# Patient Record
Sex: FEMALE | Race: WHITE | NOT HISPANIC OR LATINO | Employment: FULL TIME | URBAN - NONMETROPOLITAN AREA
[De-identification: names, ages, dates, MRNs, and addresses within clinical notes are randomized per-mention and may not be internally consistent; named-entity substitution may affect disease eponyms.]

---

## 2017-10-20 ENCOUNTER — OFFICE VISIT (OUTPATIENT)
Dept: INTERNAL MEDICINE | Facility: CLINIC | Age: 22
End: 2017-10-20

## 2017-10-20 VITALS
BODY MASS INDEX: 24.91 KG/M2 | WEIGHT: 155 LBS | RESPIRATION RATE: 12 BRPM | HEART RATE: 86 BPM | OXYGEN SATURATION: 98 % | HEIGHT: 66 IN | TEMPERATURE: 97.4 F | DIASTOLIC BLOOD PRESSURE: 70 MMHG | SYSTOLIC BLOOD PRESSURE: 112 MMHG

## 2017-10-20 DIAGNOSIS — E53.8 LOW VITAMIN B12 LEVEL: ICD-10-CM

## 2017-10-20 DIAGNOSIS — K62.5 BRBPR (BRIGHT RED BLOOD PER RECTUM): Primary | ICD-10-CM

## 2017-10-20 PROCEDURE — 99214 OFFICE O/P EST MOD 30 MIN: CPT | Performed by: FAMILY MEDICINE

## 2017-10-20 NOTE — PROGRESS NOTES
Subjective    Wanda Hunt is a 22 y.o. female here for:  Chief Complaint   Patient presents with   • Rectal Bleeding     blood in stool for about 3 weeks     History of Present Illness   She's had blood in stool x 3 weeks. No straining during bowel movements and bowel movements are not painful. She was seen previously by GI and they recommended scopes but she did not have them done. There's a family history of vitamin B12 deficiency and her level has been low previously. She stays tired. Patient has had blood in stool and bright red blood per rectum for years. She saw me in the late summer of 2015 for the same issue. No known family history of IBD.    The following portions of the patient's history were reviewed and updated as appropriate: allergies, current medications, past family history, past medical history, past social history, past surgical history and problem list.    Review of Systems   Constitutional: Negative for activity change and appetite change.   Gastrointestinal: Positive for blood in stool. Negative for abdominal pain, constipation and nausea.       Vitals:    10/20/17 1639   BP: 112/70   Pulse: 86   Resp: 12   Temp: 97.4 °F (36.3 °C)   SpO2: 98%       Objective   Physical Exam   Constitutional: She is oriented to person, place, and time. Vital signs are normal. She appears well-developed and well-nourished. She is active. She does not have a sickly appearance. She does not appear ill.   Appears stated age. Well groomed. normal body weight.   HENT:   Head: Normocephalic and atraumatic.   Right Ear: Hearing normal.   Left Ear: Hearing normal.   Eyes: EOM are normal. Pupils are equal, round, and reactive to light. No scleral icterus.   Neck: Neck supple.   Pulmonary/Chest: Effort normal.   Abdominal: Soft. Bowel sounds are normal. She exhibits no mass. There is tenderness in the epigastric area. There is no rigidity, no rebound, no guarding and negative Chanel's sign.   Musculoskeletal: She  exhibits no edema or deformity.   Neurological: She is alert and oriented to person, place, and time. Gait normal.   Skin: Skin is warm. She is not diaphoretic. No cyanosis. No pallor.   Psychiatric: She has a normal mood and affect. Her speech is normal and behavior is normal. Judgment normal.   Nursing note and vitals reviewed.    Reviewed note from GI consult dated 9/16/15. Concerns for IBD. Had labs that showed vitamin B12 in upper 200s.     Lab Results   Component Value Date    [id number] 447 06/20/2016         Assessment/Plan   Wanda was seen today for rectal bleeding.    Diagnoses and all orders for this visit:    BRBPR (bright red blood per rectum)  -     Inflammatory Bowel Disease Panel  -     Celiac Disease Panel  -     CBC Auto Differential  -     Vitamin B12 & Folate  -     Comprehensive Metabolic Panel  -     Ambulatory Referral to Gastroenterology    Low vitamin B12 level  -     Inflammatory Bowel Disease Panel  -     Celiac Disease Panel  -     CBC Auto Differential  -     Vitamin B12 & Folate  -     Comprehensive Metabolic Panel  -     Homocysteine  -     Methylmalonic Acid, Serum               Jennifer Osborne MD

## 2017-10-24 LAB
ALBUMIN SERPL-MCNC: 4.8 G/DL (ref 3.5–5)
ALBUMIN/GLOB SERPL: 1.7 G/DL (ref 1–2)
ALP SERPL-CCNC: 56 U/L (ref 38–126)
ALT SERPL-CCNC: 27 U/L (ref 13–69)
AST SERPL-CCNC: 20 U/L (ref 15–46)
BAKER'S YEAST IGA QN: <20 UNITS (ref 0–24.9)
BAKER'S YEAST IGG QN: <20 UNITS (ref 0–24.9)
BASOPHILS # BLD AUTO: 0.05 10*3/MM3 (ref 0–0.2)
BASOPHILS NFR BLD AUTO: 0.7 % (ref 0–2.5)
BILIRUB SERPL-MCNC: 0.6 MG/DL (ref 0.2–1.3)
BUN SERPL-MCNC: 20 MG/DL (ref 7–20)
BUN/CREAT SERPL: 25 (ref 7.1–23.5)
CALCIUM SERPL-MCNC: 10 MG/DL (ref 8.4–10.2)
CHLORIDE SERPL-SCNC: 100 MMOL/L (ref 98–107)
CO2 SERPL-SCNC: 28 MMOL/L (ref 26–30)
CREAT SERPL-MCNC: 0.8 MG/DL (ref 0.6–1.3)
ENDOMYSIUM IGA SER QL: NEGATIVE
EOSINOPHIL # BLD AUTO: 0.09 10*3/MM3 (ref 0–0.7)
EOSINOPHIL NFR BLD AUTO: 1.3 % (ref 0–7)
ERYTHROCYTE [DISTWIDTH] IN BLOOD BY AUTOMATED COUNT: 11.3 % (ref 11.5–14.5)
FOLATE SERPL-MCNC: 14 NG/ML
GFR SERPLBLD CREATININE-BSD FMLA CKD-EPI: 110 ML/MIN/1.73
GFR SERPLBLD CREATININE-BSD FMLA CKD-EPI: 91 ML/MIN/1.73
GLOBULIN SER CALC-MCNC: 2.8 GM/DL
GLUCOSE SERPL-MCNC: 152 MG/DL (ref 74–98)
HCT VFR BLD AUTO: 41.8 % (ref 37–47)
HCYS SERPL-SCNC: 8.7 UMOL/L (ref 0–15)
HGB BLD-MCNC: 13.9 G/DL (ref 12–16)
IGA SERPL-MCNC: 190 MG/DL (ref 87–352)
IMM GRANULOCYTES # BLD: 0.02 10*3/MM3 (ref 0–0.06)
IMM GRANULOCYTES NFR BLD: 0.3 % (ref 0–0.6)
LYMPHOCYTES # BLD AUTO: 2.19 10*3/MM3 (ref 0.6–3.4)
LYMPHOCYTES NFR BLD AUTO: 31.3 % (ref 10–50)
MCH RBC QN AUTO: 29.8 PG (ref 27–31)
MCHC RBC AUTO-ENTMCNC: 33.3 G/DL (ref 30–37)
MCV RBC AUTO: 89.5 FL (ref 81–99)
METHYLMALONATE SERPL-SCNC: 182 NMOL/L (ref 0–378)
MONOCYTES # BLD AUTO: 0.36 10*3/MM3 (ref 0–0.9)
MONOCYTES NFR BLD AUTO: 5.1 % (ref 0–12)
NEUTROPHILS # BLD AUTO: 4.29 10*3/MM3 (ref 2–6.9)
NEUTROPHILS NFR BLD AUTO: 61.3 % (ref 37–80)
NRBC BLD AUTO-RTO: 0 /100 WBC (ref 0–0)
P-ANCA ATYPICAL TITR SER IF: NORMAL TITER
PLATELET # BLD AUTO: 229 10*3/MM3 (ref 130–400)
POTASSIUM SERPL-SCNC: 3.7 MMOL/L (ref 3.5–5.1)
PROT SERPL-MCNC: 7.6 G/DL (ref 6.3–8.2)
RBC # BLD AUTO: 4.67 10*6/MM3 (ref 4.2–5.4)
SODIUM SERPL-SCNC: 141 MMOL/L (ref 137–145)
TTG IGA SER-ACNC: <2 U/ML (ref 0–3)
VIT B12 SERPL-MCNC: 426 PG/ML (ref 239–931)
WBC # BLD AUTO: 7 10*3/MM3 (ref 4.8–10.8)

## 2017-11-03 ENCOUNTER — OFFICE VISIT (OUTPATIENT)
Dept: GASTROENTEROLOGY | Facility: CLINIC | Age: 22
End: 2017-11-03

## 2017-11-03 VITALS
DIASTOLIC BLOOD PRESSURE: 78 MMHG | TEMPERATURE: 97.3 F | HEIGHT: 66 IN | SYSTOLIC BLOOD PRESSURE: 110 MMHG | HEART RATE: 88 BPM | OXYGEN SATURATION: 99 % | WEIGHT: 156.4 LBS | BODY MASS INDEX: 25.13 KG/M2

## 2017-11-03 DIAGNOSIS — K62.5 BRIGHT RED BLOOD PER RECTUM: Primary | ICD-10-CM

## 2017-11-03 DIAGNOSIS — Z90.49 HISTORY OF CHOLECYSTECTOMY: ICD-10-CM

## 2017-11-03 DIAGNOSIS — R63.4 WEIGHT LOSS: ICD-10-CM

## 2017-11-03 DIAGNOSIS — K92.1 BLOODY STOOL: ICD-10-CM

## 2017-11-03 DIAGNOSIS — R10.10 UPPER ABDOMINAL PAIN: ICD-10-CM

## 2017-11-03 PROCEDURE — 99214 OFFICE O/P EST MOD 30 MIN: CPT | Performed by: NURSE PRACTITIONER

## 2017-11-03 NOTE — PROGRESS NOTES
OUTPATIENT ESTABLISHED PATIENT NOTE  Patient: WANDA FLOOD : 1995  Date of Service: 2017  Dear Dr.Tabitha Piedad Osborne MD   Thank you for your referral of this patient for evaluation of BRBPR  CC: Rectal Bleeding (Bright red blood in stool)  Assessment/Plan                                             ASSESSMENT & PLANS     Chronic, intermittent Bright red blood per rectum / Bloody stool not r/t constipation or hemorrhoids.    Periumbilical abd pain with intermittent  N/v. Sx r/t IBD (UC vs Crohn's) vs nonspecific colitis vs internal hemorrhoids vs functional    Weight loss r/t increased activity. No night sweat or fever  History of cholecystectomy  -     ENDOSCOPY AND COLONOSCOPY  -     Occult Blood X 3, Stool - Stool, Per Rectum; Future  -     Fecal Lactoferrin - Stool, Per Rectum; Future  -     Calprotectin, Fecal - Stool, Per Rectum; Future       DISCUSSION: The above plan was delineated in details with patient and all questions and concerns were answered.  Patient is also given contact information.  Patient is to return as scheduled or sooner if new problems arise  Subjective                                                     SUBJECTIVE   History of Present Illness  Ms. Wanda Flood is a 22 y.o. female presents to the clinic today for an evaluation of Rectal Bleeding (Bright red blood in stool).  I saw pt 2 yrs for this in 2015.  Pt was recommended to have an EGD and colonoscopy, but pt did not proceed d/t anxiety at the time.   Pt represents to clinic to w/ similar issues.   Continues to have intermittent bright red bloody stools. BM about once daily w/ soft/loose as well as normal/formed stools.  No hard/lumpy stool or watery stools. No nocturnal diarrhea  Does not have bloody stools w/ every BM, but about every wk.  Bloody stool occurs even when stools are soft/normal.  She denies any incontinence, straining, hemorrhoids, having hard/lumpy stools or mucus in stools. No painful  defecation or rectal pain.  Pt denies consuming any suspected food or unpurified water.  No one w/ whom pt has shared a meal has developed diarrhea or exposed to sick contact.  No frequent abx. Pt was not hospitalized in the months leading up to diarrheal illness. No recent travel outside of the [place]. Pt has no family hx of inflammatory bowel disease.     Reports of periumbilical abd pain. Pain is constant. Worsened eat. Relieved sometimes w/ having a BM and waiting for time to pass. Pain occasionally wakes pt up at night. ++She has associated nausea and occasionally vomiting.    Takes Aleve PRN for HA and menstrual cramps. Hx of irregular menstruation cycles. On birth control pill. Under care of OB-GYN. Pelvic US WNL in 9/2015  Lost 10-15 lbs w/in in the 3-4 months. No night sweat. Attributes to increased walking when she worked at [employer] 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    Previous work up w/ CBC, CMP, thyroid panel, lipase/amylase, IBD panel, CRP, and celiac panel was negative. Ferritin low at 11, but no anemia. Transferrin saturation not done.     ROS:Review of Systems   Constitutional: Positive for fatigue. Negative for activity change, appetite change, fever and unexpected weight change.   HENT: Negative for hearing loss, trouble swallowing and voice change.    Eyes: Negative for visual disturbance.   Respiratory: Negative for cough, choking, chest tightness and shortness of breath.    Cardiovascular: Negative for chest pain.   Gastrointestinal: Positive for abdominal pain, anal bleeding, blood in stool (bright red), diarrhea, nausea and vomiting. Negative for abdominal distention, constipation and rectal pain.   Endocrine: Negative for polydipsia and polyphagia.   Genitourinary: Negative.    Musculoskeletal: Negative for gait problem and joint swelling.   Skin: Negative for color change and rash.   Allergic/Immunologic: Negative for food allergies.   Neurological: Negative for dizziness,  seizures and speech difficulty.   Hematological: Negative for adenopathy.   Psychiatric/Behavioral: Negative for confusion.     Subjective   PAST MED HX: Pt  has a past medical history of Endometriosis; Fainting; and Migraine.  PAST SURG HX: Pt  has a past surgical history that includes Appendectomy; Tonsillectomy; Cholecystectomy; and Mandible fracture surgery.  FAM HX: family history includes Alcohol abuse in her other; Arthritis in her other; skin Cancer in her paternal grandmother; Colon polyps in her father (who is 50s); Diabetes in her paternal grandmother; Heart attack in her other; Hypertension in her father and other; Migraines in her mother; Obesity in her other; Stroke in her paternal grandmother.  SOC HX: Pt  reports that she has never smoked. She has never used smokeless tobacco. She reports that she does not drink alcohol or use illicit drugs.  Objective                                                           OBJECTIVE   Allergy: Pt is allergic to flagyl [metronidazole]; amoxicillin; and penicillins.  MEDS: •  Levonorgestrel (LUIZ) 13.5 MG intrauterine device IUD, 1 each by Intrauterine route 1 (one) time., Disp: , Rfl:   IBD Panel  Lab Results   Component Value Date    ATYPPANC <1:20 10/20/2017    SCERVIGG <20.0 10/20/2017    SCERVIGA <20.0 10/20/2017     Celiac Panel  Lab Results   Component Value Date    TTRANSGLIGA <2 10/20/2017    [id number] Negative 10/20/2017     10/20/2017     CRP (Normal 0.00 - 1.0 mg/dL or  0.00 - 10.0 mg/L)   Lab Results   Component Value Date    CRP 0.5 09/16/2015     Lab Results   Component Value Date    WBC 7.00 10/20/2017    HGB 13.9 10/20/2017    HCT 41.8 10/20/2017    MCV 89.5 10/20/2017    MCHC 33.3 10/20/2017     10/20/2017     Lab Results   Component Value Date     10/20/2017    K 3.7 10/20/2017     10/20/2017    CO2 28.0 10/20/2017    BUN 20 10/20/2017    CREATININE 0.80 10/20/2017    GLUCOSE 101 (H) 09/16/2015    CALCIUM 10.0  10/20/2017    ANIONGAP 5 09/16/2015     Lab Results   Component Value Date    AST 20 10/20/2017    AST 13 06/20/2016    AST 19 09/16/2015    ALT 27 10/20/2017    ALT 10 06/20/2016    ALT 16 09/16/2015    ALKPHOS 56 10/20/2017    ALKPHOS 79 06/20/2016    ALKPHOS 68 09/16/2015    BILITOT 0.6 10/20/2017    PROTEINTOT 7.3 09/16/2015    ALBUMIN 4.80 10/20/2017    LIPASE 27 09/16/2015     BUN Creatine Ratio (Normal 7.0 - 25.0)  Lab Results   Component Value Date    BCR 25.0 (H) 10/20/2017    BCR 21 (H) 06/20/2016      TIBC (Normal 250 - 450 ug/dL)  Lab Results   Component Value Date    TIBC 389 09/16/2015     Serum Iron (Normal 38 - 169 ug/dL)  Lab Results   Component Value Date    IRON 62 09/16/2015     Transferrin or Iron Saturation % (Normal 20 - 50 %)  No results found for: LABIRON  Ferritin (Normal 20.00 - 291.00 ng/mL)  Lab Results   Component Value Date    FERRITIN 11 09/16/2015        Vitamin B 12 (Normal 211 - 946 pg/mL)  Lab Results   Component Value Date    [id number] 426 10/20/2017    [id number] 447 06/20/2016    [id number] 294 09/16/2015     Folate (Normal >3.0 ng/mL)  Lab Results   Component Value Date    FOLATE 14.00 10/20/2017    FOLATE 14.0 06/20/2016    FOLATE 15.9 09/16/2015      Methylmalonic Acid (Normal 0 - 378 nmol/L)  Lab Results   Component Value Date    METHYLACID 182 10/20/2017     TSH (Normal 0.350 - 5.350 mIU/mL)  Lab Results   Component Value Date    TSH 1.840 06/20/2016    TSH 1.491 09/16/2015     EXAMINATION- AU-ULTRASOUND OF THE PELVIS, COMPLETE NON OB-09/16/2015-       INDICATION- ABDOMINAL PAIN.       TECHNIQUE- Pelvic ultrasound was performed in the longitudinal and  transverse planes.         COMPARISON- NONE      FINDINGS- The uterus is of normal size measuring 2.5 x 7.4 cm. There is  no thickening of the endometrium (3 mm). There is no uterine mass.   There is no intrauterine gestational sac.  There is no ovarian mass or  cul-de-sac fluid.          IMPRESSION- Normal transabdominal  ultrasound of the pelvis.     Wt Readings from Last 10 Encounters:   11/03/17 156 lb 6.4 oz (70.9 kg)   10/20/17 155 lb (70.3 kg)   10/25/16 165 lb 3.2 oz (74.9 kg)   08/24/16 163 lb 8 oz (74.2 kg)   08/15/16 164 lb 8 oz (74.6 kg)   08/08/16 166 lb (75.3 kg)   06/20/16 170 lb 14.4 oz (77.5 kg)   05/18/16 172 lb (78 kg)   09/10/15 165 lb 8 oz (75.1 kg) (90 %, Z= 1.29)*     body mass index is 25.24 kg/(m^2).,Temp: 97.3 °F (36.3 °C),BP: 110/78,Heart Rate: 88   Physical Exam   Abdominal:         General Well developed; well nourished; no acute distress.   ENT Oral mucosa pink and moist without thrush or lesions.    Neck Neck supple; trachea midline. No thyromegaly   Resp CTA; no rhonchi, rales, or wheezes.  Respiration effort normal  CV RRR; normal S1, S2; no M/R/G. No lower extremity edema  GI Abd soft, mild TTP area note above. No guarding or rebound or rigidity, ND, normal active bowel sounds.  No HSM.  No abd hernia  Skin No rash; no lesions; no bruises.  Skin turgor normal  Musc No clubbing; no cyanosis.  Gait steady  Psych Oriented to time, place, and person.  Appropriate affect  Thank you kindly for allowing me to be part of this patient’s care.    Pt care team: Shilpa FU & Nagi Fitch, [facility]--Gastroenterology  [phone]    CC: Dr.Tabitha Piedad Osborne MD  [address] [place] [address] FAX:[phone]

## 2018-01-04 ENCOUNTER — HOSPITAL ENCOUNTER (EMERGENCY)
Facility: HOSPITAL | Age: 23
Discharge: HOME | End: 2018-01-04
Attending: EMERGENCY MEDICINE | Admitting: EMERGENCY MEDICINE

## 2018-01-04 VITALS
OXYGEN SATURATION: 95 % | RESPIRATION RATE: 18 BRPM | DIASTOLIC BLOOD PRESSURE: 70 MMHG | TEMPERATURE: 98 F | HEIGHT: 66 IN | SYSTOLIC BLOOD PRESSURE: 106 MMHG | HEART RATE: 87 BPM | WEIGHT: 155 LBS | BODY MASS INDEX: 24.91 KG/M2

## 2018-01-04 DIAGNOSIS — R11.2 NON-INTRACTABLE VOMITING WITH NAUSEA, UNSPECIFIED VOMITING TYPE: Primary | ICD-10-CM

## 2018-01-04 DIAGNOSIS — E86.0 DEHYDRATION: ICD-10-CM

## 2018-01-04 LAB
ALBUMIN SERPL-MCNC: 4.6 G/DL (ref 3.2–4.8)
ALBUMIN/GLOB SERPL: 1.8 G/DL (ref 1.5–2.5)
ALP SERPL-CCNC: 65 U/L (ref 25–100)
ALT SERPL W P-5'-P-CCNC: 19 U/L (ref 7–40)
ANION GAP SERPL CALCULATED.3IONS-SCNC: 14 MMOL/L (ref 3–11)
AST SERPL-CCNC: 24 U/L (ref 0–33)
B-HCG UR QL: NEGATIVE
BACTERIA UR QL AUTO: ABNORMAL /HPF
BASOPHILS # BLD AUTO: 0.01 10*3/MM3 (ref 0–0.2)
BASOPHILS NFR BLD AUTO: 0.1 % (ref 0–1)
BILIRUB SERPL-MCNC: 1.3 MG/DL (ref 0.3–1.2)
BILIRUB UR QL STRIP: NEGATIVE
BUN BLD-MCNC: 18 MG/DL (ref 9–23)
BUN/CREAT SERPL: 30 (ref 7–25)
CALCIUM SPEC-SCNC: 9.4 MG/DL (ref 8.7–10.4)
CHLORIDE SERPL-SCNC: 101 MMOL/L (ref 99–109)
CLARITY UR: CLEAR
CO2 SERPL-SCNC: 23 MMOL/L (ref 20–31)
COLOR UR: YELLOW
CREAT BLD-MCNC: 0.6 MG/DL (ref 0.6–1.3)
DEPRECATED RDW RBC AUTO: 38.4 FL (ref 37–54)
EOSINOPHIL # BLD AUTO: 0.03 10*3/MM3 (ref 0–0.3)
EOSINOPHIL NFR BLD AUTO: 0.3 % (ref 0–3)
ERYTHROCYTE [DISTWIDTH] IN BLOOD BY AUTOMATED COUNT: 11.7 % (ref 11.3–14.5)
GFR SERPL CREATININE-BSD FRML MDRD: 126 ML/MIN/1.73
GLOBULIN UR ELPH-MCNC: 2.5 GM/DL
GLUCOSE BLD-MCNC: 107 MG/DL (ref 70–100)
GLUCOSE UR STRIP-MCNC: NEGATIVE MG/DL
HCT VFR BLD AUTO: 41.2 % (ref 34.5–44)
HGB BLD-MCNC: 13.7 G/DL (ref 11.5–15.5)
HGB UR QL STRIP.AUTO: NEGATIVE
HOLD SPECIMEN: NORMAL
HOLD SPECIMEN: NORMAL
HYALINE CASTS UR QL AUTO: ABNORMAL /LPF
IMM GRANULOCYTES # BLD: 0.02 10*3/MM3 (ref 0–0.03)
IMM GRANULOCYTES NFR BLD: 0.2 % (ref 0–0.6)
KETONES UR QL STRIP: NEGATIVE
LEUKOCYTE ESTERASE UR QL STRIP.AUTO: NEGATIVE
LIPASE SERPL-CCNC: 20 U/L (ref 6–51)
LYMPHOCYTES # BLD AUTO: 0.39 10*3/MM3 (ref 0.6–4.8)
LYMPHOCYTES NFR BLD AUTO: 3.9 % (ref 24–44)
MCH RBC QN AUTO: 30 PG (ref 27–31)
MCHC RBC AUTO-ENTMCNC: 33.3 G/DL (ref 32–36)
MCV RBC AUTO: 90.4 FL (ref 80–99)
MONOCYTES # BLD AUTO: 0.45 10*3/MM3 (ref 0–1)
MONOCYTES NFR BLD AUTO: 4.5 % (ref 0–12)
NEUTROPHILS # BLD AUTO: 9.03 10*3/MM3 (ref 1.5–8.3)
NEUTROPHILS NFR BLD AUTO: 91 % (ref 41–71)
NITRITE UR QL STRIP: NEGATIVE
PH UR STRIP.AUTO: 8.5 [PH] (ref 5–8)
PLATELET # BLD AUTO: 194 10*3/MM3 (ref 150–450)
PMV BLD AUTO: 11.5 FL (ref 6–12)
POTASSIUM BLD-SCNC: 3.9 MMOL/L (ref 3.5–5.5)
PROT SERPL-MCNC: 7.1 G/DL (ref 5.7–8.2)
PROT UR QL STRIP: ABNORMAL
RBC # BLD AUTO: 4.56 10*6/MM3 (ref 3.89–5.14)
RBC # UR: ABNORMAL /HPF
REF LAB TEST METHOD: ABNORMAL
SODIUM BLD-SCNC: 138 MMOL/L (ref 132–146)
SP GR UR STRIP: 1.02 (ref 1–1.03)
SQUAMOUS #/AREA URNS HPF: ABNORMAL /HPF
UROBILINOGEN UR QL STRIP: ABNORMAL
WBC NRBC COR # BLD: 9.93 10*3/MM3 (ref 4.5–13.5)
WBC UR QL AUTO: ABNORMAL /HPF
WHOLE BLOOD HOLD SPECIMEN: NORMAL
WHOLE BLOOD HOLD SPECIMEN: NORMAL

## 2018-01-04 PROCEDURE — 25010000002 PROMETHAZINE PER 50 MG: Performed by: EMERGENCY MEDICINE

## 2018-01-04 PROCEDURE — 96374 THER/PROPH/DIAG INJ IV PUSH: CPT

## 2018-01-04 PROCEDURE — 83690 ASSAY OF LIPASE: CPT | Performed by: EMERGENCY MEDICINE

## 2018-01-04 PROCEDURE — 25010000002 ONDANSETRON PER 1 MG: Performed by: EMERGENCY MEDICINE

## 2018-01-04 PROCEDURE — 81025 URINE PREGNANCY TEST: CPT | Performed by: EMERGENCY MEDICINE

## 2018-01-04 PROCEDURE — 81001 URINALYSIS AUTO W/SCOPE: CPT | Performed by: EMERGENCY MEDICINE

## 2018-01-04 PROCEDURE — 80053 COMPREHEN METABOLIC PANEL: CPT | Performed by: EMERGENCY MEDICINE

## 2018-01-04 PROCEDURE — 85025 COMPLETE CBC W/AUTO DIFF WBC: CPT | Performed by: EMERGENCY MEDICINE

## 2018-01-04 PROCEDURE — 96375 TX/PRO/DX INJ NEW DRUG ADDON: CPT

## 2018-01-04 PROCEDURE — 96361 HYDRATE IV INFUSION ADD-ON: CPT

## 2018-01-04 PROCEDURE — 99284 EMERGENCY DEPT VISIT MOD MDM: CPT

## 2018-01-04 RX ORDER — SODIUM CHLORIDE 9 MG/ML
1000 INJECTION, SOLUTION INTRAVENOUS ONCE
Status: COMPLETED | OUTPATIENT
Start: 2018-01-04 | End: 2018-01-04

## 2018-01-04 RX ORDER — ONDANSETRON 4 MG/1
4 TABLET, ORALLY DISINTEGRATING ORAL ONCE
Status: COMPLETED | OUTPATIENT
Start: 2018-01-04 | End: 2018-01-04

## 2018-01-04 RX ORDER — ALUMINA, MAGNESIA, AND SIMETHICONE 2400; 2400; 240 MG/30ML; MG/30ML; MG/30ML
15 SUSPENSION ORAL ONCE
Status: COMPLETED | OUTPATIENT
Start: 2018-01-04 | End: 2018-01-04

## 2018-01-04 RX ORDER — ONDANSETRON 4 MG/1
4 TABLET, FILM COATED ORAL EVERY 6 HOURS PRN
Qty: 4 TABLET | Refills: 0 | Status: SHIPPED | OUTPATIENT
Start: 2018-01-04 | End: 2018-04-18

## 2018-01-04 RX ORDER — FAMOTIDINE 10 MG/ML
20 INJECTION, SOLUTION INTRAVENOUS ONCE
Status: COMPLETED | OUTPATIENT
Start: 2018-01-04 | End: 2018-01-04

## 2018-01-04 RX ORDER — PROMETHAZINE HYDROCHLORIDE 25 MG/ML
12.5 INJECTION, SOLUTION INTRAMUSCULAR; INTRAVENOUS ONCE AS NEEDED
Status: COMPLETED | OUTPATIENT
Start: 2018-01-04 | End: 2018-01-04

## 2018-01-04 RX ORDER — PROMETHAZINE HYDROCHLORIDE 25 MG/1
25 TABLET ORAL EVERY 6 HOURS PRN
Qty: 4 TABLET | Refills: 0 | Status: SHIPPED | OUTPATIENT
Start: 2018-01-04 | End: 2018-04-18

## 2018-01-04 RX ORDER — ONDANSETRON 2 MG/ML
4 INJECTION INTRAMUSCULAR; INTRAVENOUS ONCE
Status: COMPLETED | OUTPATIENT
Start: 2018-01-04 | End: 2018-01-04

## 2018-01-04 RX ORDER — SODIUM CHLORIDE 0.9 % (FLUSH) 0.9 %
10 SYRINGE (ML) INJECTION AS NEEDED
Status: DISCONTINUED | OUTPATIENT
Start: 2018-01-04 | End: 2018-01-04 | Stop reason: HOSPADM

## 2018-01-04 RX ADMIN — SODIUM CHLORIDE 1000 ML: 9 INJECTION, SOLUTION INTRAVENOUS at 01:37

## 2018-01-04 RX ADMIN — ONDANSETRON 4 MG: 4 TABLET, ORALLY DISINTEGRATING ORAL at 05:04

## 2018-01-04 RX ADMIN — PROMETHAZINE HYDROCHLORIDE 12.5 MG: 25 INJECTION INTRAMUSCULAR; INTRAVENOUS at 03:08

## 2018-01-04 RX ADMIN — LIDOCAINE HYDROCHLORIDE 15 ML: 20 SOLUTION ORAL; TOPICAL at 01:41

## 2018-01-04 RX ADMIN — ONDANSETRON 4 MG: 2 INJECTION INTRAMUSCULAR; INTRAVENOUS at 01:38

## 2018-01-04 RX ADMIN — SODIUM CHLORIDE 1000 ML: 9 INJECTION, SOLUTION INTRAVENOUS at 03:05

## 2018-01-04 RX ADMIN — FAMOTIDINE 20 MG: 10 INJECTION INTRAVENOUS at 01:40

## 2018-01-04 RX ADMIN — ALUMINUM HYDROXIDE, MAGNESIUM HYDROXIDE, AND DIMETHICONE 15 ML: 400; 400; 40 SUSPENSION ORAL at 01:41

## 2018-01-04 NOTE — ED PROVIDER NOTES
Subjective   HPI Comments: Ms. Wanda Hunt is a 22 year old female with a hx of appendectomy and cholecystectomy who presents to the ED with c/o vomiting. Around 1600 this afternoon, the patient reports becoming suddenly nauseous. At 1900 this evening, the patient reports that she began to vomit, became dizzy, and developed intermittent fever/chills. Later this evening, the patient reports noticing red streaks in her vomit. The patient also notes that her last BM was abnormally loose. The patient has an endoscopy scheduled with Dr. Yosef Jacobs on January 15, 2018. The patient denies any recent cough, abd pain, or constipation. The patient denies any other acute sx at this time.    Patient is a 22 y.o. female presenting with vomiting.   History provided by:  Patient  Vomiting   The primary symptoms include fever, nausea, vomiting, diarrhea and hematemesis. Primary symptoms do not include abdominal pain. The illness began today. The onset was sudden. The problem has been gradually worsening.   The hematemesis is associated with dizziness.   The illness is also significant for chills. The illness does not include constipation.       Review of Systems   Constitutional: Positive for chills and fever.   Respiratory: Negative for cough.    Gastrointestinal: Positive for diarrhea, hematemesis, nausea and vomiting. Negative for abdominal pain and constipation.   Neurological: Positive for dizziness and syncope.   All other systems reviewed and are negative.      Past Medical History:   Diagnosis Date   • Endometriosis    • Fainting    • Migraine        Allergies   Allergen Reactions   • Flagyl [Metronidazole] Shortness Of Breath   • Amoxicillin    • Penicillins        Past Surgical History:   Procedure Laterality Date   • APPENDECTOMY      Onset Date: Dec. 2013   • CHOLECYSTECTOMY     • MANDIBLE FRACTURE SURGERY     • TONSILLECTOMY      Onset Date: March 2015       Family History   Problem Relation Age of Onset   •  Migraines Mother    • Hypertension Father    • Colon polyps Father      precancerous   • Arthritis Other    • Alcohol abuse Other    • Heart attack Other    • Obesity Other    • Hypertension Other    • Cancer Paternal Grandmother    • Diabetes Paternal Grandmother    • Stroke Paternal Grandmother        Social History     Social History   • Marital status: Single     Spouse name: N/A   • Number of children: N/A   • Years of education: N/A     Social History Main Topics   • Smoking status: Never Smoker   • Smokeless tobacco: Never Used   • Alcohol use No   • Drug use: No   • Sexual activity: Yes     Partners: Male     Birth control/ protection: IUD     Other Topics Concern   • None     Social History Narrative         Objective   Physical Exam   Constitutional: She is oriented to person, place, and time. She appears well-developed and well-nourished. No distress.   HENT:   Head: Normocephalic and atraumatic.   Eyes: Conjunctivae are normal. No scleral icterus.   Neck: Normal range of motion. Neck supple.   Cardiovascular: Normal rate, regular rhythm and normal heart sounds.  Exam reveals no gallop and no friction rub.    No murmur heard.  Pulmonary/Chest: Effort normal and breath sounds normal. No respiratory distress. She has no wheezes. She has no rales.   Abdominal: Soft. Bowel sounds are normal. There is tenderness in the right upper quadrant, epigastric area, suprapubic area and left upper quadrant. There is no guarding.   Generalized upper abd TTP. Moderately TTP suprapubic region.   Musculoskeletal: Normal range of motion.   Neurological: She is alert and oriented to person, place, and time.   Skin: Skin is warm and dry. She is not diaphoretic.   Psychiatric: She has a normal mood and affect. Her behavior is normal.   Nursing note and vitals reviewed.      Procedures         ED Course  ED Course       Recent Results (from the past 24 hour(s))   Urinalysis With / Culture If Indicated - Urine, Clean Catch     Collection Time: 01/04/18  1:22 AM   Result Value Ref Range    Color, UA Yellow Yellow, Straw    Appearance, UA Clear Clear    pH, UA 8.5 (H) 5.0 - 8.0    Specific Gravity, UA 1.025 1.001 - 1.030    Glucose, UA Negative Negative    Ketones, UA Negative Negative    Bilirubin, UA Negative Negative    Blood, UA Negative Negative    Protein, UA 30 mg/dL (1+) (A) Negative    Leuk Esterase, UA Negative Negative    Nitrite, UA Negative Negative    Urobilinogen, UA 1.0 E.U./dL 0.2 - 1.0 E.U./dL   Pregnancy, Urine - Urine, Clean Catch    Collection Time: 01/04/18  1:22 AM   Result Value Ref Range    HCG, Urine QL Negative Negative   Urinalysis, Microscopic Only - Urine, Clean Catch    Collection Time: 01/04/18  1:22 AM   Result Value Ref Range    RBC, UA 3-6 (A) None Seen, 0-2 /HPF    WBC, UA 0-2 (A) None Seen /HPF    Bacteria, UA Trace None Seen, Trace /HPF    Squamous Epithelial Cells, UA 3-6 (A) None Seen, 0-2 /HPF    Hyaline Casts, UA 0-6 0 - 6 /LPF    Methodology Automated Microscopy    Comprehensive Metabolic Panel    Collection Time: 01/04/18  1:36 AM   Result Value Ref Range    Glucose 107 (H) 70 - 100 mg/dL    BUN 18 9 - 23 mg/dL    Creatinine 0.60 0.60 - 1.30 mg/dL    Sodium 138 132 - 146 mmol/L    Potassium 3.9 3.5 - 5.5 mmol/L    Chloride 101 99 - 109 mmol/L    CO2 23.0 20.0 - 31.0 mmol/L    Calcium 9.4 8.7 - 10.4 mg/dL    Total Protein 7.1 5.7 - 8.2 g/dL    Albumin 4.60 3.20 - 4.80 g/dL    ALT (SGPT) 19 7 - 40 U/L    AST (SGOT) 24 0 - 33 U/L    Alkaline Phosphatase 65 25 - 100 U/L    Total Bilirubin 1.3 (H) 0.3 - 1.2 mg/dL    eGFR Non African Amer 126 >60 mL/min/1.73    Globulin 2.5 gm/dL    A/G Ratio 1.8 1.5 - 2.5 g/dL    BUN/Creatinine Ratio 30.0 (H) 7.0 - 25.0    Anion Gap 14.0 (H) 3.0 - 11.0 mmol/L   Lipase    Collection Time: 01/04/18  1:36 AM   Result Value Ref Range    Lipase 20 6 - 51 U/L   CBC Auto Differential    Collection Time: 01/04/18  1:36 AM   Result Value Ref Range    WBC 9.93 4.50 - 13.50  "10*3/mm3    RBC 4.56 3.89 - 5.14 10*6/mm3    Hemoglobin 13.7 11.5 - 15.5 g/dL    Hematocrit 41.2 34.5 - 44.0 %    MCV 90.4 80.0 - 99.0 fL    MCH 30.0 27.0 - 31.0 pg    MCHC 33.3 32.0 - 36.0 g/dL    RDW 11.7 11.3 - 14.5 %    RDW-SD 38.4 37.0 - 54.0 fl    MPV 11.5 6.0 - 12.0 fL    Platelets 194 150 - 450 10*3/mm3    Neutrophil % 91.0 (H) 41.0 - 71.0 %    Lymphocyte % 3.9 (L) 24.0 - 44.0 %    Monocyte % 4.5 0.0 - 12.0 %    Eosinophil % 0.3 0.0 - 3.0 %    Basophil % 0.1 0.0 - 1.0 %    Immature Grans % 0.2 0.0 - 0.6 %    Neutrophils, Absolute 9.03 (H) 1.50 - 8.30 10*3/mm3    Lymphocytes, Absolute 0.39 (L) 0.60 - 4.80 10*3/mm3    Monocytes, Absolute 0.45 0.00 - 1.00 10*3/mm3    Eosinophils, Absolute 0.03 0.00 - 0.30 10*3/mm3    Basophils, Absolute 0.01 0.00 - 0.20 10*3/mm3    Immature Grans, Absolute 0.02 0.00 - 0.03 10*3/mm3     Note: In addition to lab results from this visit, the labs listed above may include labs taken at another facility or during a different encounter within the last 24 hours. Please correlate lab times with ED admission and discharge times for further clarification of the services performed during this visit.    No orders to display     Vitals:    01/04/18 0031   BP: 110/63   BP Location: Left arm   Patient Position: Sitting   Pulse: 111   Resp: 18   Temp: 97.6 °F (36.4 °C)   TempSrc: Oral   SpO2: 97%   Weight: 70.3 kg (155 lb)   Height: 167.6 cm (66\")     Medications   sodium chloride 0.9 % flush 10 mL (not administered)   promethazine (PHENERGAN) injection 12.5 mg (not administered)   sodium chloride 0.9 % infusion 1,000 mL (1,000 mL Intravenous New Bag 1/4/18 0137)   ondansetron (ZOFRAN) injection 4 mg (4 mg Intravenous Given 1/4/18 0138)   famotidine (PEPCID) injection 20 mg (20 mg Intravenous Given 1/4/18 0140)   aluminum-magnesium hydroxide-simethicone (MAALOX MAX) 400-400-40 MG/5ML suspension 15 mL (15 mL Oral Given 1/4/18 0141)   lidocaine viscous (XYLOCAINE) 2 % mouth solution 15 mL (15 mL " Mouth/Throat Given 1/4/18 0141)     ECG/EMG Results (last 24 hours)     ** No results found for the last 24 hours. **        Pt feels much better following treatment and is comfortable with DC at this time.              MDM    Final diagnoses:   Non-intractable vomiting with nausea, unspecified vomiting type   Dehydration       Documentation assistance provided by negra Garcia.  Information recorded by the scribe was done at my direction and has been verified and validated by me.     Simone Garcia  01/04/18 0214       Rob Ramos DO  01/07/18 0449

## 2018-01-15 ENCOUNTER — LAB REQUISITION (OUTPATIENT)
Dept: LAB | Facility: HOSPITAL | Age: 23
End: 2018-01-15

## 2018-01-15 ENCOUNTER — OUTSIDE FACILITY SERVICE (OUTPATIENT)
Dept: GASTROENTEROLOGY | Facility: CLINIC | Age: 23
End: 2018-01-15

## 2018-01-15 DIAGNOSIS — R10.10 UPPER ABDOMINAL PAIN: ICD-10-CM

## 2018-01-15 DIAGNOSIS — R10.84 GENERALIZED ABDOMINAL PAIN: ICD-10-CM

## 2018-01-15 DIAGNOSIS — Z12.11 ENCOUNTER FOR SCREENING FOR MALIGNANT NEOPLASM OF COLON: ICD-10-CM

## 2018-01-15 PROCEDURE — 43239 EGD BIOPSY SINGLE/MULTIPLE: CPT | Performed by: INTERNAL MEDICINE

## 2018-01-15 PROCEDURE — 88305 TISSUE EXAM BY PATHOLOGIST: CPT | Performed by: INTERNAL MEDICINE

## 2018-01-15 PROCEDURE — 45380 COLONOSCOPY AND BIOPSY: CPT | Performed by: INTERNAL MEDICINE

## 2018-01-16 LAB
CYTO UR: NORMAL
LAB AP CASE REPORT: NORMAL
LAB AP CLINICAL INFORMATION: NORMAL
Lab: NORMAL
PATH REPORT.FINAL DX SPEC: NORMAL
PATH REPORT.GROSS SPEC: NORMAL

## 2018-01-17 ENCOUNTER — TELEPHONE (OUTPATIENT)
Dept: GASTROENTEROLOGY | Facility: CLINIC | Age: 23
End: 2018-01-17

## 2018-01-17 NOTE — TELEPHONE ENCOUNTER
----- Message from Yosef Jacobs MD sent at 1/16/2018  3:26 PM EST -----  Results are as such:  Colonoscopy:  Normal random colon biopsies and normal mucosa. Terminal ileum mucosa grossly and microscopically normal.    EGD:   Please inform patient that the biopsies showed some inflammation (swelling) in the lining of her stomach.  Advise to avoid NSAIDS (ibuprofen, aleve, motrin, high dose aspirin) and to take a proton pump inhibitor such as omeprazole/pantoprazole 30 minutes before breakfast for at least 6 weeks.  Tylenol at a dose up to 2500 mg a day (4-5 extra strength tylenol) are ok to use for pain. The patient's small bowel duodenal biopsies were negative for celiac disease.    Thank you,    Dr. Jacobs

## 2018-04-18 ENCOUNTER — OFFICE VISIT (OUTPATIENT)
Dept: OBSTETRICS AND GYNECOLOGY | Facility: CLINIC | Age: 23
End: 2018-04-18

## 2018-04-18 VITALS
HEIGHT: 65 IN | DIASTOLIC BLOOD PRESSURE: 78 MMHG | WEIGHT: 162.8 LBS | HEART RATE: 82 BPM | SYSTOLIC BLOOD PRESSURE: 118 MMHG | OXYGEN SATURATION: 98 % | BODY MASS INDEX: 27.12 KG/M2 | RESPIRATION RATE: 14 BRPM

## 2018-04-18 DIAGNOSIS — N89.8 VAGINAL DISCHARGE: Primary | ICD-10-CM

## 2018-04-18 DIAGNOSIS — Z53.21 PATIENT LEFT AFTER TRIAGE: ICD-10-CM

## 2018-04-18 PROCEDURE — 99212 OFFICE O/P EST SF 10 MIN: CPT | Performed by: NURSE PRACTITIONER

## 2018-04-18 RX ORDER — CLINDAMYCIN HYDROCHLORIDE 300 MG/1
300 CAPSULE ORAL 2 TIMES DAILY
Qty: 14 CAPSULE | Refills: 0 | Status: SHIPPED | OUTPATIENT
Start: 2018-04-18 | End: 2018-05-03

## 2018-04-18 NOTE — PROGRESS NOTES
[facility]      Wanda Hunt  [id number]  1995      Chief Complaint: Gynecologic Exam (White vaginal discharge with odor, itching and burning for about 2-3 weeks)        History of present illness:    Wanda Hunt is a 23 y.o. year old  presenting to be seen for her annual exam with c/o vaginal discharge with odor. She was last seen for routine GYN care by Dr. Conner in 2016.     SEXUAL Hx:  She is currently sexually active.  In the past year there has not been new sexual partners.    Condoms are not typically used.  She would not like to be screened for STD's at today's exam.  Current birth control method: IUD - Ban.  She is happy with her current method of contraception and does not want to discuss alternative methods of contraception.  MENSTRUAL Hx:  No LMP recorded (lmp unknown). Patient has had an implant.  In the past 6 months her cycles have been absent.   Her menstrual flow has been absent.   Each month on average there are roughly 0 days of very heavy flow.    Intermenstrual bleeding is absent.    Post-coital bleeding is absent.  Dysmenorrhea: is not affecting her activities of daily living  PMS: is not affecting her activities of daily living  Her cycles are not a source of concern for her that she wishes to discuss today.  HEALTH Hx:  She exercises regularly: no (and has no plans to become more active).  She wears her seat belt:yes.  She has concerns about domestic violence: no.  She is a non-smoker.      Past Medical History:   Diagnosis Date   • Endometriosis    • Fainting    • IUD (intrauterine device) in place     Ban IUD   • Migraine        Past Surgical History:   Procedure Laterality Date   • APPENDECTOMY      Onset Date: Dec. 2013   • CHOLECYSTECTOMY     • COLONOSCOPY W/ BIOPSIES     • MANDIBLE FRACTURE SURGERY     • TONSILLECTOMY      Onset Date: 2015       MEDICATIONS: The current medication list was reviewed and reconciled.     Allergies:  is  "allergic to flagyl [metronidazole]; amoxicillin; and penicillins.    Family History   Problem Relation Age of Onset   • Migraines Mother    • Hypertension Father    • Colon polyps Father      precancerous   • Arthritis Other    • Alcohol abuse Other    • Heart attack Other    • Obesity Other    • Hypertension Other    • Cancer Paternal Grandmother    • Diabetes Paternal Grandmother    • Stroke Paternal Grandmother        Health Maintenance:  She has not yet had an initial pap smear.     Review of Systems   Constitutional: Negative for fatigue, fever and unexpected weight change.   HENT: Negative for congestion, ear pain, hearing loss, sinus pressure and trouble swallowing.    Eyes: Negative for visual disturbance.   Respiratory: Negative for cough, chest tightness, shortness of breath and wheezing.    Cardiovascular: Negative for chest pain, palpitations and leg swelling.   Gastrointestinal: Negative for abdominal distention, abdominal pain, constipation, diarrhea, nausea and vomiting.   Endocrine: Negative for cold intolerance, heat intolerance, polydipsia, polyphagia and polyuria.   Genitourinary: Positive for vaginal discharge (with itching, burning, odor x 2-3 weeks). Negative for difficulty urinating, dyspareunia, dysuria, frequency, hematuria, pelvic pain, urgency, vaginal bleeding and vaginal pain.   Musculoskeletal: Negative for arthralgias, gait problem, joint swelling and myalgias.   Skin: Negative for color change, pallor and rash.   Neurological: Negative for dizziness, seizures, syncope, weakness, light-headedness, numbness and headaches.   Hematological: Negative for adenopathy. Does not bruise/bleed easily.   Psychiatric/Behavioral: Negative for agitation, confusion, sleep disturbance and suicidal ideas. The patient is not nervous/anxious.        Physical Exam   Vital Signs: /78   Pulse 82   Resp 14   Ht 165.1 cm (65\")   Wt 73.8 kg (162 lb 12.8 oz)   LMP  (LMP Unknown) Comment: Ban IUD  " SpO2 98%   Breastfeeding? No   BMI 27.09 kg/m²    General Appearance:  alert, cooperative, no apparent distress and appears stated age   Neurologic/Psychiatric: A&O x 3, gait steady, appropriate affect   HEENT:  Normocephalic, without obvious abnormality, mucous membranes moist   Lungs:   Clear to auscultation bilaterally; respirations regular, even, and unlabored bilaterally   Heart:  Regular rate and rhythm, no murmurs appreciated   Breasts:  deferred   Abdomen:   Soft, non-tender, non-distended and no organomegaly   Extremities: Normal, atraumatic; no clubbing, cyanosis, or edema    Skin: No rashes, ulcers, or suspicious lesions noted   Pelvic: deferred       GYN Physical exam was not completed. Patient and I discussed her symptoms and treatment options, but she left office prior to physical examination. No exam, wet mount, or pap smear able to be performed. Patient's visit scheduled as a 10 minute gyn follow-up although she was in need of annual exam with new problem. Patient was unable to stay for remainder of exam as she had to get back to work and clinic running behind this day. PO Cleocin sent to pharmacy for treatment of suspected BV now. She may return at her convenience for pap smear and remainder of annual exam. Encouraged to call if symptoms worsen or persist despite treatment.      Procedure Note:  No notes on file    Assessment and Plan:    Wanda was seen today for gynecologic exam.    Diagnoses and all orders for this visit:    Vaginal discharge    Patient left after triage    Other orders  -     Cancel: Pap IG, Rfx HPV ASCU; Future  -     clindamycin (CLEOCIN) 300 MG capsule; Take 1 capsule by mouth 2 (Two) Times a Day.        Return for annual exam or PRN.      TANK Peterson      Note: Speech recognition transcription software was used to dictate portions of this document.  An attempt at proofreading has been made though minor errors in transcription may still be present.  Please do  not hesitate to call our office with any questions.

## 2018-05-03 ENCOUNTER — OFFICE VISIT (OUTPATIENT)
Dept: OBSTETRICS AND GYNECOLOGY | Facility: CLINIC | Age: 23
End: 2018-05-03

## 2018-05-03 VITALS
RESPIRATION RATE: 14 BRPM | OXYGEN SATURATION: 99 % | HEART RATE: 68 BPM | WEIGHT: 165 LBS | SYSTOLIC BLOOD PRESSURE: 108 MMHG | DIASTOLIC BLOOD PRESSURE: 78 MMHG | HEIGHT: 65 IN | BODY MASS INDEX: 27.49 KG/M2

## 2018-05-03 DIAGNOSIS — B37.9 CANDIDA INFECTION: Primary | ICD-10-CM

## 2018-05-03 PROCEDURE — 99213 OFFICE O/P EST LOW 20 MIN: CPT | Performed by: OBSTETRICS & GYNECOLOGY

## 2018-05-03 RX ORDER — TERCONAZOLE 80 MG/1
80 SUPPOSITORY VAGINAL NIGHTLY
Qty: 3 SUPPOSITORY | Refills: 1 | Status: SHIPPED | OUTPATIENT
Start: 2018-05-03 | End: 2018-09-17

## 2018-05-03 NOTE — PROGRESS NOTES
Subjective   Chief Complaint   Patient presents with   • Follow-up     White vaginal discharge with odor, itching and burning.     Wanda Hunt is a 23 y.o. year old  presenting to be seen for evaluation of an abnormal vaginal discharge. She was seen recently for a vaginal infection and was treated with doxycycline that provided her no relief.    Prior to the onset of symptoms she was on systemic antibiotics.  She has not recently changed soaps/detergents/toilet tissue.  Prior to this visit, she has used cyclic in an attempt to improve her symptoms.    SEXUAL Hx:  She is currently sexually active.  In the past year there has not been new sexual partners.    Condoms are not typically used.  She would not like to be screened for STD's at today's exam.  Current birth control method: IUD - Mirena.  She is happy with her current method of contraception and does not want to discuss alternative methods of contraception..  MENSTRUAL Hx:  No LMP recorded (lmp unknown). Patient has had an implant.  In the past 6 months her cycles have been regular, predictable and occur monthly.   Her menstrual flow is normal.   Each month on average there are roughly  0 days of very heavy flow.    Intermenstrual bleeding is absent.    Post-coital bleeding is absent.  Dysmenorrhea: is not affecting her activities of daily living  PMS: is not affecting her activities of daily living  OTHER COMPLAINTS:  Nothing else    The following portions of the patient's history were reviewed and updated as appropriate:current medications and allergies    Review of Systems  Constitutional POS: nothing reported    NEG: anorexia or night sweats   Gastointestinal POS: nothing reported    NEG: bloating, change in bowel habits, melena or reflux symptoms   Genitourinary POS: see HPI    NEG: dysuria or hematuria   Integument POS: nothing reported    NEG: moles that are changing in size, shape, color or rashes        Objective   /78   Pulse 68    "Resp 14   Ht 165.1 cm (65\")   Wt 74.8 kg (165 lb)   LMP  (LMP Unknown) Comment: Ban IUD  SpO2 99%   Breastfeeding? No   BMI 27.46 kg/m²     General:  well developed; well nourished  no acute distress   Skin:  No suspicious lesions seen   Pelvis: Clinical staff was present for exam  External genitalia:  normal appearance of the external genitalia including Bartholin's and [place]'s glands.  :  urethral meatus normal;  Vaginal:  normal pink mucosa without prolapse or lesions. discharge present -  mucousy, yellow and white; one swab collected      Lab Review   No data reviewed    Imaging   No data reviewed       Assessment   1. Vaginitis     Plan   1. Will treat patient empirically with Terazol for presumed yeast infection.  Await culture results for plan of care.  Patient will otherwise return to clinic in 1 year for her annual exam.      No orders of the defined types were placed in this encounter.         This note was electronically signed.    Tha Matt M.D. [place]  May 3, 2018  "

## 2018-05-21 ENCOUNTER — OFFICE VISIT (OUTPATIENT)
Dept: OBSTETRICS AND GYNECOLOGY | Facility: CLINIC | Age: 23
End: 2018-05-21

## 2018-05-21 VITALS
SYSTOLIC BLOOD PRESSURE: 116 MMHG | RESPIRATION RATE: 16 BRPM | WEIGHT: 165 LBS | BODY MASS INDEX: 27.46 KG/M2 | DIASTOLIC BLOOD PRESSURE: 70 MMHG

## 2018-05-21 DIAGNOSIS — Z01.419 WELL WOMAN EXAM WITH ROUTINE GYNECOLOGICAL EXAM: Primary | ICD-10-CM

## 2018-05-21 DIAGNOSIS — R10.30 LOWER ABDOMINAL PAIN: ICD-10-CM

## 2018-05-21 DIAGNOSIS — N94.10 DYSPAREUNIA IN FEMALE: ICD-10-CM

## 2018-05-21 DIAGNOSIS — Z87.42 HISTORY OF ENDOMETRIOSIS: ICD-10-CM

## 2018-05-21 DIAGNOSIS — N94.6 DYSMENORRHEA: ICD-10-CM

## 2018-05-21 PROCEDURE — 99395 PREV VISIT EST AGE 18-39: CPT | Performed by: NURSE PRACTITIONER

## 2018-05-21 NOTE — PROGRESS NOTES
[facility] ANNUAL [facility]AN VISIT      Wanda Hunt  [id number]  1995      Chief Complaint: Annual Exam; Dyspareunia; and Abdominal Pain        History of present illness:    Wanda Hunt is a 23 y.o. year old  presenting to be seen for her annual exam. She is accompanied today by her fiance.   Upon arrival today Wanda c/o pain with intercourse, low abdominal pain, and painful menses. She reports a h/o endometriosis  s/p diagnostic laparoscopy x 1 in the past. She feels her symptoms are similar to those she had prior to her surgery and is concerned about recurrent endometriosis.     SEXUAL Hx:  She is currently sexually active.  In the past year there has not been new sexual partners.    Condoms are not typically used.  She would not like to be screened for STD's at today's exam.  Current birth control method: IUD - Ban.  She is happy with her current method of contraception and does not want to discuss alternative methods of contraception.  MENSTRUAL Hx:  No LMP recorded. Patient has had an implant.  In the past 6 months her cycles have been regular, predictable and occur monthly.   Her menstrual flow is typically light and flow is normal.   Each month on average there are roughly 0 days of very heavy flow.    Intermenstrual bleeding is absent.    Post-coital bleeding is absent.  Dysmenorrhea: moderate and severe  PMS: is not affecting her activities of daily living  Her cycles ARE a source of concern for her that she wishes to discuss today due to worsening dysmenorrhea.  HEALTH Hx:  She exercises regularly: yes.  She wears her seat belt:yes.  She has concerns about domestic violence: no.  She is a non-smoker.      Past Medical History:   Diagnosis Date   • Endometriosis    • Fainting    • IUD (intrauterine device) in place     Ban IUD   • Migraine        Past Surgical History:   Procedure Laterality Date   • APPENDECTOMY      Onset Date: Dec. 2013   • CHOLECYSTECTOMY     •  COLONOSCOPY W/ BIOPSIES  2018   • MANDIBLE FRACTURE SURGERY     • TONSILLECTOMY      Onset Date: March 2015       MEDICATIONS: The current medication list was reviewed and reconciled.     Allergies:  is allergic to flagyl [metronidazole]; amoxicillin; and penicillins.    Family History   Problem Relation Age of Onset   • Migraines Mother    • Hypertension Father    • Colon polyps Father         precancerous   • Arthritis Other    • Alcohol abuse Other    • Heart attack Other    • Obesity Other    • Hypertension Other    • Cancer Paternal Grandmother    • Diabetes Paternal Grandmother    • Stroke Paternal Grandmother        Review of Systems   Constitutional: Negative for fatigue, fever and unexpected weight change.   HENT: Negative for congestion, ear pain, hearing loss, sinus pressure and trouble swallowing.    Eyes: Negative for visual disturbance.   Respiratory: Negative for cough, chest tightness, shortness of breath and wheezing.    Cardiovascular: Negative for chest pain, palpitations and leg swelling.   Gastrointestinal: Positive for abdominal pain (low). Negative for abdominal distention, constipation, diarrhea, nausea and vomiting.   Endocrine: Negative for cold intolerance, heat intolerance, polydipsia, polyphagia and polyuria.   Genitourinary: Positive for dyspareunia, menstrual problem (recurrent painful menses) and pelvic pain. Negative for difficulty urinating, dysuria, frequency, hematuria, urgency, vaginal bleeding, vaginal discharge and vaginal pain.   Musculoskeletal: Negative for arthralgias, gait problem, joint swelling and myalgias.   Skin: Negative for color change, pallor and rash.   Neurological: Negative for dizziness, seizures, syncope, weakness, light-headedness, numbness and headaches.   Hematological: Negative for adenopathy. Does not bruise/bleed easily.   Psychiatric/Behavioral: Negative for agitation, confusion, sleep disturbance and suicidal ideas. The patient is not nervous/anxious.         Physical Exam  Vital Signs: /70   Resp 16   Wt 74.8 kg (165 lb)   BMI 27.46 kg/m²    General Appearance:  alert, cooperative, no apparent distress and appears stated age   Neurologic/Psychiatric: A&O x 3, gait steady, appropriate affect   HEENT:  Normocephalic, without obvious abnormality, mucous membranes moist   Neck: Supple, symmetrical, trachea midline, no adenopathy;  No thyromegaly, masses, or tenderness   Back:   Symmetric, no curvature, ROM normal, no CVA tenderness   Lungs:   Clear to auscultation bilaterally; respirations regular, even, and unlabored bilaterally   Heart:  Regular rate and rhythm, no murmurs appreciated   Breasts:  Symmetrical, no masses, no lesions and no nipple discharge   Abdomen:   Soft, non-distended, no organomegaly and tenderness in generalized lower abdomen   Lymph nodes: No cervical, supraclavicular, inguinal or axillary adenopathy noted   Extremities: Normal, atraumatic; no clubbing, cyanosis, or edema    Skin: No rashes, ulcers, or suspicious lesions noted   Pelvic: External Genitalia  without lesions or skin changes  Vagina  is pink, moist, without lesions.   Cervix  normal, without lesions, no discharge, no CMT, IUD strings well visualized and pap smear obtained  Uterus  normal size, midposition, mobile and nontender  Ovaries  without palpable masses or fullness.   Parametria  smooth  Rectovaginal  Female rectovaginal: deferred       Procedure Note:  No notes on file    Assessment and Plan:    Wanda was seen today for annual exam, dyspareunia and abdominal pain.    Diagnoses and all orders for this visit:    Well woman exam with routine gynecological exam  -     Pap IG, Ct-Ng TV Rfx HPV ASCU; Future  -     Pap IG, Ct-Ng TV Rfx HPV ASCU    History of endometriosis    Dysmenorrhea    Lower abdominal pain    Dyspareunia in female        Pap was done today.  If she does not receive the results of the Pap within 2 weeks  time, she was instructed to call to find out  the results.  I explained to Wanda that the recommendations for Pap smear interval in a low risk patient's has lengthened to 3 years time.  I encouraged her to be seen yearly for a full physical exam including breast and pelvic exam even during the off years when PAP's will not be performed.    She was recommended to begin mammograms at age 40 for breast cancer screening, colonoscopy at age 50 for colon cancer screening and bone density scans (DEXA) at age 60-65 for osteoporosis screening.    We discussed her history of endometriosis and her new complaints. She states her recurrent dyspareunia and dysmenorrhea despite light menses is concerning her of recurrent endometriosis. She has questions regarding repeat diagnostic laparoscopy. We discussed a variety of treatment options for this condition including conservative management with hormonal methods and NSAIDs, as well as surgical options. We also reviewed a variety of other causes for pelvic pain. I explained to her that I am not a surgeon and cannot make the decision about this treatment option. She is interested in an appointment with one of our physicians to further discuss options. I will call her to discuss further upon return of her pap smear results.     I will call next week to discuss next steps.       TANK Peterson      Note: Speech recognition transcription software was used to dictate portions of this document.  An attempt at proofreading has been made though minor errors in transcription may still be present.  Please do not hesitate to call our office with any questions.

## 2018-05-29 ENCOUNTER — TELEPHONE (OUTPATIENT)
Dept: OBSTETRICS AND GYNECOLOGY | Facility: CLINIC | Age: 23
End: 2018-05-29

## 2018-05-29 NOTE — TELEPHONE ENCOUNTER
Attempted to call patient to review pap results. There was no answer, voicemail message left for patient to return call to our office at her convenience.

## 2018-05-30 PROBLEM — R87.612 LOW GRADE SQUAMOUS INTRAEPITHELIAL LESION (LGSIL) ON CERVICAL PAP SMEAR: Status: ACTIVE | Noted: 2018-05-30

## 2018-05-30 NOTE — TELEPHONE ENCOUNTER
Patient and I were able to speak via phone this morning to review her pap smear results. This is her first pap smear to her knowledge. Discussed LSIL. I am requesting reflex HPV testing for high risk strains. We discussed correlation to MALU 1 and guideline recommendations for management in women under age 24. Recommendation is for repeat pap in 12 months regardless of HPV result. If over age 24, colposcopy with directed biopsies would be recommended. She v/u and states she may feel more comfortable repeat pap at 6 months.   She also describes ongoing pelvic pain that she fears may be recurrent endometriosis. She is s/p diagnostic laparoscopy x 1 in the past and has questions about repeating this procedure. This was discussed at her recent visit. She desires further consultation with one of our physicians. I will notify front office staff and ask them to call her back this week to schedule.

## 2018-06-04 ENCOUNTER — OFFICE VISIT (OUTPATIENT)
Dept: OBSTETRICS AND GYNECOLOGY | Facility: CLINIC | Age: 23
End: 2018-06-04

## 2018-06-04 VITALS
BODY MASS INDEX: 27.52 KG/M2 | RESPIRATION RATE: 14 BRPM | HEIGHT: 65 IN | WEIGHT: 165.2 LBS | SYSTOLIC BLOOD PRESSURE: 114 MMHG | DIASTOLIC BLOOD PRESSURE: 60 MMHG

## 2018-06-04 DIAGNOSIS — R10.2 CHRONIC PELVIC PAIN IN FEMALE: Primary | ICD-10-CM

## 2018-06-04 DIAGNOSIS — G89.29 CHRONIC PELVIC PAIN IN FEMALE: Primary | ICD-10-CM

## 2018-06-04 PROCEDURE — 99213 OFFICE O/P EST LOW 20 MIN: CPT | Performed by: OBSTETRICS & GYNECOLOGY

## 2018-06-05 NOTE — PROGRESS NOTES
"Subjective   Chief Complaint   Patient presents with   • Follow-up     Surgery consult for endometriosis     Wanda Hunt is a 23 y.o. year old .  Patient's last menstrual period was 2018 (approximate).  She presents to be seen for Consultation for surgery for chronic pelvic pain.  Patient has a history significant for chronic pelvic pain, dysmenorrhea and dyspareunia.  She was diagnosed with endometriosis approximately 4 years ago by laparoscopy and notes worsening dyspareunia and dysmenorrhea for the last 5-6 months.     OTHER COMPLAINTS:  Nothing else    The following portions of the patient's history were reviewed and updated as appropriate:current medications and allergies    Smoking status: Never Smoker                                                              Smokeless tobacco: Never Used                        Review of Systems  Constitutional POS: nothing reported    NEG: anorexia or night sweats   Gastointestinal POS: nothing reported    NEG: bloating, change in bowel habits, melena or reflux symptoms   Genitourinary POS: see HPI    NEG: dysuria or hematuria   Integument POS: nothing reported    NEG: moles that are changing in size, shape, color or rashes   Breast POS: nothing reported    NEG: persistent breast lump, skin dimpling or nipple discharge         Objective   /60   Resp 14   Ht 165.1 cm (65\")   Wt 74.9 kg (165 lb 3.2 oz)   LMP 2018 (Approximate)   Breastfeeding? No Comment: Ban IUD  BMI 27.49 kg/m²     General:  well developed; well nourished  no acute distress   Skin:  No suspicious lesions seen   Thyroid: normal to inspection and palpation   Lungs:  breathing is unlabored   Heart:  regular rate and rhythm, S1, S2 normal, no murmur, click, rub or gallop   Breasts:  Not performed.   Abdomen: soft, non-tender; no masses  no umbilical or inginual hernias are present  no hepato-splenomegaly   Pelvis: Clinical staff was present for exam  External genitalia:  " normal appearance of the external genitalia including Bartholin's and [place]'s glands.  :  urethral meatus normal;  Vaginal:  normal pink mucosa without prolapse or lesions.  Cervix:  cervical motion tenderness is present;  Uterus:  normal size, shape and consistency. tenderness to palpation is present;  Adnexa:  tenderness of the bilateral adnexa to  superficial and deep palpation;     Lab Review   No data reviewed    Imaging   No data reviewed        Assessment   1. Chronic pelvic pain  2. Endometriosis     Plan   1. Discussed therapeutic options with patient.  Patient opts for laparoscopic removal of endometriosis implants.  Will schedule.      No orders of the defined types were placed in this encounter.         This note was electronically signed.    Tha Matt M.D. [place]

## 2018-06-27 ENCOUNTER — OUTSIDE FACILITY SERVICE (OUTPATIENT)
Dept: OBSTETRICS AND GYNECOLOGY | Facility: CLINIC | Age: 23
End: 2018-06-27

## 2018-06-27 PROCEDURE — 58662 LAPAROSCOPY EXCISE LESIONS: CPT | Performed by: OBSTETRICS & GYNECOLOGY

## 2018-07-13 ENCOUNTER — OFFICE VISIT (OUTPATIENT)
Dept: OBSTETRICS AND GYNECOLOGY | Facility: CLINIC | Age: 23
End: 2018-07-13

## 2018-07-13 VITALS
HEIGHT: 65 IN | BODY MASS INDEX: 27.56 KG/M2 | SYSTOLIC BLOOD PRESSURE: 110 MMHG | DIASTOLIC BLOOD PRESSURE: 66 MMHG | WEIGHT: 165.4 LBS | RESPIRATION RATE: 14 BRPM

## 2018-07-13 DIAGNOSIS — Z09 POSTOPERATIVE EXAMINATION: Primary | ICD-10-CM

## 2018-07-13 PROCEDURE — 99024 POSTOP FOLLOW-UP VISIT: CPT | Performed by: OBSTETRICS & GYNECOLOGY

## 2018-07-13 NOTE — PROGRESS NOTES
"Subjective   Chief Complaint   Patient presents with   • Post-op     No complaints     Wanda Hunt is a 23 y.o. year old  presenting to be seen for her post-operative visit.  Currently she reports no problems with eating, bowel movements, voiding, or wound drainage and pain is well controlled.    There was no pathology result associated with Wanda's recent procedure.      OTHER COMPLAINTS:  Nothing else    The following portions of the patient's history were reviewed and updated as appropriate:current medications and allergies       Objective   /66   Resp 14   Ht 165.1 cm (65\")   Wt 75 kg (165 lb 6.4 oz)   LMP  (LMP Unknown)   Breastfeeding? No   BMI 27.52 kg/m²     General:  well developed; well nourished  no acute distress   Abdomen: soft, non-tender; no masses  no umbilical or inginual hernias are present  no hepato-splenomegaly  incision is healed   Pelvis: Clinical staff was present for exam          Assessment   1. S/P laparoscopy and fulgaration of endometriosis     Plan   1. May return to full activity with no restrictions  2. Follow up for annual exam 3-4 months    No orders of the defined types were placed in this encounter.         This note was electronically signed.    Tha Matt M.D. [place]  2018  "

## 2018-09-17 ENCOUNTER — OFFICE VISIT (OUTPATIENT)
Dept: INTERNAL MEDICINE | Facility: CLINIC | Age: 23
End: 2018-09-17

## 2018-09-17 VITALS
HEART RATE: 85 BPM | DIASTOLIC BLOOD PRESSURE: 72 MMHG | TEMPERATURE: 97.5 F | HEIGHT: 65 IN | BODY MASS INDEX: 27.66 KG/M2 | SYSTOLIC BLOOD PRESSURE: 102 MMHG | WEIGHT: 166 LBS | OXYGEN SATURATION: 98 %

## 2018-09-17 DIAGNOSIS — K52.9 ACUTE GASTROENTERITIS: Primary | ICD-10-CM

## 2018-09-17 PROCEDURE — 99213 OFFICE O/P EST LOW 20 MIN: CPT | Performed by: PHYSICIAN ASSISTANT

## 2018-09-17 RX ORDER — ONDANSETRON HYDROCHLORIDE 8 MG/1
8 TABLET, FILM COATED ORAL EVERY 8 HOURS PRN
Qty: 12 TABLET | Refills: 0 | Status: SHIPPED | OUTPATIENT
Start: 2018-09-17 | End: 2018-10-08

## 2018-09-17 NOTE — PROGRESS NOTES
Subjective     Chief Complaint: nausea    History of Present Illness     Wanda Hunt is a 23 y.o. female presenting with complaints of nausea, vomiting, diarrhea x 1 week.  Diarrhea started first about one week ago, then 2-3 days later she started having nausea and vomiting.  No blood or mucus in the diarrhea.  She is only been able to tolerate eating small amounts of food such as toast and mashed potatoes.  Some abdominal soreness however she feels this is due to the vomiting and diarrhea.  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  Denies fevers. She's been drinking sprite.    Patient had her gallbladder out in 2015.  Was following with GI last year as well, had colonoscopy due to blood in her stool.  This improved.  She did labs last year to help rule out things like celiac and inflammatory bowel diseases.  Never received a specific diagnosis.    She also struggles with endometriosis however this typically hurts her more in the lower abdomen bilaterally.  Has Ban IUD. Periods are heavy and irregular.    Denies GERD symptoms but struggles with gas and bloating at times.    Denies dysuria, hematuria, discharge, urgency, frequency.    The following portions of the patient's history were reviewed and updated as appropriate: current medications, allergies, PMH.    Review of Systems   Constitutional: Negative for appetite change, chills, fatigue, fever and unexpected weight change.   HENT: Negative for congestion, ear pain, hearing loss, nosebleeds, sinus pressure, sore throat, tinnitus and trouble swallowing.    Eyes: Negative for pain, discharge, redness, itching and visual disturbance.   Respiratory: Negative for cough, chest tightness, shortness of breath and wheezing.    Cardiovascular: Negative for chest pain, palpitations and leg swelling.   Gastrointestinal: Positive for diarrhea, nausea and vomiting. Negative for abdominal pain, blood in stool and constipation.   Endocrine:  "Negative for cold intolerance, heat intolerance, polydipsia, polyphagia and polyuria.   Genitourinary: Positive for menstrual problem. Negative for decreased urine volume, dysuria, flank pain, frequency and hematuria.   Musculoskeletal: Negative for arthralgias, back pain, gait problem, joint swelling, myalgias, neck pain and neck stiffness.   Skin: Negative for color change and rash.   Allergic/Immunologic: Negative for environmental allergies, food allergies and immunocompromised state.   Neurological: Negative for dizziness, syncope, weakness, light-headedness and headaches.   Hematological: Negative for adenopathy. Does not bruise/bleed easily.   Psychiatric/Behavioral: Negative for dysphoric mood, sleep disturbance and suicidal ideas. The patient is not nervous/anxious.      Objective     Vitals:    09/17/18 1631   BP: 102/72   Pulse: 85   Temp: 97.5 °F (36.4 °C)   SpO2: 98%   Weight: 75.3 kg (166 lb)   Height: 165.1 cm (65\")     Physical Exam   Constitutional: She is oriented to person, place, and time. She appears well-developed.   HENT:   Mouth/Throat: Oropharynx is clear and moist.   Cardiovascular: Normal rate and regular rhythm.    Pulmonary/Chest: Effort normal and breath sounds normal.   Abdominal: Soft. Bowel sounds are normal. There is generalized tenderness. There is no guarding, no CVA tenderness and negative Chanel's sign.   Musculoskeletal: Normal range of motion.   Lymphadenopathy:     She has no cervical adenopathy.   Neurological: She is alert and oriented to person, place, and time.   Skin: Skin is warm and dry.   Psychiatric: She has a normal mood and affect.     Assessment/Plan     Diagnoses and all orders for this visit:    Acute gastroenteritis  -     ondansetron (ZOFRAN) 8 MG tablet; Take 1 tablet by mouth Every 8 (Eight) Hours As Needed for Nausea or Vomiting.    Will treat as gastroenteritis at this time.  Zofran when necessary.  Increase water intake, brat diet, advance as " tolerated.  Mychart message or call if symptoms worsen or fail to improve.  Consider labs at that time.    Leila Ocasio PA-C  09/17/2018         Please note that portions of this note were completed with a voice recognition program. Efforts were made to edit dictation, but occasionally words are mistranscribed.

## 2018-10-08 ENCOUNTER — OFFICE VISIT (OUTPATIENT)
Dept: RETAIL CLINIC | Facility: CLINIC | Age: 23
End: 2018-10-08

## 2018-10-08 VITALS
BODY MASS INDEX: 27.42 KG/M2 | TEMPERATURE: 98.4 F | HEART RATE: 75 BPM | DIASTOLIC BLOOD PRESSURE: 68 MMHG | RESPIRATION RATE: 16 BRPM | HEIGHT: 65 IN | OXYGEN SATURATION: 98 % | SYSTOLIC BLOOD PRESSURE: 116 MMHG | WEIGHT: 164.6 LBS

## 2018-10-08 DIAGNOSIS — J02.9 SORE THROAT: ICD-10-CM

## 2018-10-08 DIAGNOSIS — R68.89 FLU-LIKE SYMPTOMS: ICD-10-CM

## 2018-10-08 DIAGNOSIS — J06.9 UPPER RESPIRATORY TRACT INFECTION, UNSPECIFIED TYPE: Primary | ICD-10-CM

## 2018-10-08 LAB
EXPIRATION DATE: NORMAL
EXPIRATION DATE: NORMAL
FLUAV AG NPH QL: NEGATIVE
FLUBV AG NPH QL: NEGATIVE
INTERNAL CONTROL: NORMAL
INTERNAL CONTROL: NORMAL
Lab: NORMAL
Lab: NORMAL
S PYO AG THROAT QL: NEGATIVE

## 2018-10-08 PROCEDURE — 87880 STREP A ASSAY W/OPTIC: CPT | Performed by: NURSE PRACTITIONER

## 2018-10-08 PROCEDURE — 99213 OFFICE O/P EST LOW 20 MIN: CPT | Performed by: NURSE PRACTITIONER

## 2018-10-08 PROCEDURE — 87804 INFLUENZA ASSAY W/OPTIC: CPT | Performed by: NURSE PRACTITIONER

## 2018-10-08 RX ORDER — PSEUDOEPHEDRINE HCL 120 MG/1
120 TABLET, FILM COATED, EXTENDED RELEASE ORAL EVERY 12 HOURS
Qty: 20 TABLET | Refills: 0 | Status: SHIPPED | OUTPATIENT
Start: 2018-10-08 | End: 2018-10-18

## 2018-10-08 RX ORDER — FLUTICASONE PROPIONATE 50 MCG
2 SPRAY, SUSPENSION (ML) NASAL DAILY
Qty: 1 BOTTLE | Refills: 0 | Status: SHIPPED | OUTPATIENT
Start: 2018-10-08 | End: 2018-10-18

## 2018-10-08 NOTE — PATIENT INSTRUCTIONS
"Drink plenty of clear, decaffeinated fluids, as tolerated.  Acetaminophen or ibuprofen, per package directions, as needed for headache, sore throat, fever > 100    Upper Respiratory Infection, Adult  Most upper respiratory infections (URIs) are caused by a virus. A URI affects the nose, throat, and upper air passages. The most common type of URI is often called \"the common cold.\"  Follow these instructions at home:  · Take medicines only as told by your doctor.  · Gargle warm saltwater or take cough drops to comfort your throat as told by your doctor.  · Use a warm mist humidifier or inhale steam from a shower to increase air moisture. This may make it easier to breathe.  · Drink enough fluid to keep your pee (urine) clear or pale yellow.  · Eat soups and other clear broths.  · Have a healthy diet.  · Rest as needed.  · Go back to work when your fever is gone or your doctor says it is okay.  ? You may need to stay home longer to avoid giving your URI to others.  ? You can also wear a face mask and wash your hands often to prevent spread of the virus.  · Use your inhaler more if you have asthma.  · Do not use any tobacco products, including cigarettes, chewing tobacco, or electronic cigarettes. If you need help quitting, ask your doctor.  Contact a doctor if:  · You are getting worse, not better.  · Your symptoms are not helped by medicine.  · You have chills.  · You are getting more short of breath.  · You have brown or red mucus.  · You have yellow or brown discharge from your nose.  · You have pain in your face, especially when you bend forward.  · You have a fever.  · You have puffy (swollen) neck glands.  · You have pain while swallowing.  · You have white areas in the back of your throat.  Get help right away if:  · You have very bad or constant:  ? Headache.  ? Ear pain.  ? Pain in your forehead, behind your eyes, and over your cheekbones (sinus pain).  ? Chest pain.  · You have long-lasting (chronic) lung " disease and any of the following:  ? Wheezing.  ? Long-lasting cough.  ? Coughing up blood.  ? A change in your usual mucus.  · You have a stiff neck.  · You have changes in your:  ? Vision.  ? Hearing.  ? Thinking.  ? Mood.  This information is not intended to replace advice given to you by your health care provider. Make sure you discuss any questions you have with your health care provider.  Document Released: 06/05/2009 Document Revised: 08/20/2017 Document Reviewed: 03/25/2015  [employer] Interactive Patient Education © 2018 [employer]

## 2018-10-08 NOTE — PROGRESS NOTES
"OSMANIBEGIN@  Wanda Hunt is a 23 y.o. female.   Chief Complaint   Patient presents with   • Sore Throat      Wanda was exposed to a friend with the flu after she started having URI symptoms.        Sore Throat    This is a new problem. Episode onset: 3 days. The problem has been gradually worsening. Neither side of throat is experiencing more pain than the other. The pain is at a severity of 5/10. Associated symptoms include congestion, diarrhea, headaches, a hoarse voice and vomiting (Wanda states stomach \"issues\" are normal for her). Pertinent negatives include no abdominal pain, coughing, drooling, ear discharge, ear pain, plugged ear sensation, neck pain, shortness of breath, swollen glands or trouble swallowing. Treatments tried: zofran for n/v; excedrine migraine for headache. The treatment provided mild relief.        The following portions of the patient's history were reviewed and updated as appropriate: allergies, current medications, past family history, past medical history, past social history, past surgical history and problem list.    Current Outpatient Prescriptions:   •  fluticasone (FLONASE) 50 MCG/ACT nasal spray, 2 sprays into the nostril(s) as directed by provider Daily for 10 days., Disp: 1 bottle, Rfl: 0  •  Levonorgestrel (LUIZ) 13.5 MG intrauterine device IUD, 1 each by Intrauterine route 1 (one) time., Disp: , Rfl:   •  pseudoephedrine (SUDAFED 12 HOUR) 120 MG 12 hr tablet, Take 1 tablet by mouth Every 12 (Twelve) Hours for 10 days., Disp: 20 tablet, Rfl: 0    Allergies   Allergen Reactions   • Flagyl [Metronidazole] Anaphylaxis   • Amoxicillin Hives   • Penicillins Hives       Review of Systems   Constitutional: Positive for chills and fatigue. Negative for fever.   HENT: Positive for congestion, hoarse voice, postnasal drip, sinus pressure, sneezing and sore throat. Negative for drooling, ear discharge, ear pain, tinnitus and trouble swallowing.    Eyes: Negative for redness " "and itching.   Respiratory: Negative for cough, shortness of breath and wheezing.    Cardiovascular: Negative for palpitations.   Gastrointestinal: Positive for diarrhea, nausea and vomiting (Wanda states stomach \"issues\" are normal for her). Negative for abdominal pain.   Musculoskeletal: Positive for myalgias. Negative for neck pain.   Skin: Negative for rash.   Neurological: Positive for headaches. Negative for dizziness.       Objective     Visit Vitals  /68 (BP Location: Left arm, Patient Position: Sitting, Cuff Size: Adult)   Pulse 75   Temp 98.4 °F (36.9 °C) (Oral)   Resp 16   Ht 165.1 cm (65\")   Wt 74.7 kg (164 lb 9.6 oz)   SpO2 98%   BMI 27.39 kg/m²         Physical Exam   Constitutional: She is oriented to person, place, and time. She appears well-developed and well-nourished. She appears ill.   HENT:   Head: Normocephalic.   Right Ear: Tympanic membrane, external ear and ear canal normal.   Left Ear: Tympanic membrane, external ear and ear canal normal.   Nose: Mucosal edema present. No sinus tenderness.   Mouth/Throat: Uvula is midline and mucous membranes are normal. Posterior oropharyngeal erythema present.   Eyes: Conjunctivae are normal.   Cardiovascular: Normal rate, regular rhythm and normal heart sounds.    Pulmonary/Chest: Effort normal and breath sounds normal.   Abdominal: Soft. Bowel sounds are normal. There is generalized tenderness.   Lymphadenopathy:     She has no cervical adenopathy.   Neurological: She is alert and oriented to person, place, and time.   Skin: Capillary refill takes less than 2 seconds.   Turgor normal       Lab Results (last 24 hours)     Procedure Component Value Units Date/Time    POCT rapid strep A [[id number]]  (Normal) Collected:  10/08/18 1155    Specimen:  Swab Updated:  10/08/18 1155     Rapid Strep A Screen Negative     Internal Control Passed     Lot Number [id number]     Expiration Date 02/29/2020          Assessment/Plan   Wanda was seen today for sore " throat.    Diagnoses and all orders for this visit:    Upper respiratory tract infection, unspecified type  -     fluticasone (FLONASE) 50 MCG/ACT nasal spray; 2 sprays into the nostril(s) as directed by provider Daily for 10 days.  -     pseudoephedrine (SUDAFED 12 HOUR) 120 MG 12 hr tablet; Take 1 tablet by mouth Every 12 (Twelve) Hours for 10 days.  - Drink plenty of clear, decaffeinated fluids, as tolerated.  - Acetaminophen or ibuprofen, per package directions, as needed for headache, fever >100, sore throat  - Good handwashing practices advised    Sore throat  -     POCT rapid strep A    Flu-like symptoms  -     POCT Influenza A/B  - Zofran, as prescribed previously, as needed for n/v    An After Visit Summary was reviewed, printed and given to the patient.  Understanding verbalized and patient agreed with treatment plan.  If symptom(s) worsen or fail to improve, return to clinic, follow up with PCP or go to [facility].

## 2018-10-16 ENCOUNTER — OFFICE VISIT (OUTPATIENT)
Dept: OBSTETRICS AND GYNECOLOGY | Facility: CLINIC | Age: 23
End: 2018-10-16

## 2018-10-16 VITALS
DIASTOLIC BLOOD PRESSURE: 70 MMHG | BODY MASS INDEX: 27.42 KG/M2 | HEIGHT: 65 IN | WEIGHT: 164.6 LBS | RESPIRATION RATE: 14 BRPM | SYSTOLIC BLOOD PRESSURE: 110 MMHG

## 2018-10-16 DIAGNOSIS — G89.29 CHRONIC PELVIC PAIN IN FEMALE: ICD-10-CM

## 2018-10-16 DIAGNOSIS — N80.9 ENDOMETRIOSIS: Primary | ICD-10-CM

## 2018-10-16 DIAGNOSIS — R10.2 CHRONIC PELVIC PAIN IN FEMALE: ICD-10-CM

## 2018-10-16 PROCEDURE — 99212 OFFICE O/P EST SF 10 MIN: CPT | Performed by: OBSTETRICS & GYNECOLOGY

## 2018-10-16 NOTE — PROGRESS NOTES
"Subjective   Chief Complaint   Patient presents with   • Follow-up     Still having pelvic pain     Wanda Hunt is a 23 y.o. year old .  No LMP recorded (lmp unknown). Patient has had an implant.  She presents to be seen for follow-up on her pelvic pain.  Patient has known stage II/III endometriosis.  She states that she has not gotten much relief from her pain since surgery.  She still notes dysmenorrhea, dyspareunia and generalized, daily pelvic pain.    OTHER COMPLAINTS:  Nothing else    The following portions of the patient's history were reviewed and updated as appropriate:current medications and allergies    Smoking status: Never Smoker                                                              Smokeless tobacco: Never Used                        Review of Systems  Constitutional POS: nothing reported    NEG: anorexia or night sweats   Gastointestinal POS: nothing reported    NEG: bloating, change in bowel habits, melena or reflux symptoms   Genitourinary POS: see HPI    NEG: dysuria or hematuria   Integument POS: nothing reported    NEG: moles that are changing in size, shape, color or rashes   Breast POS: nothing reported    NEG: persistent breast lump, skin dimpling or nipple discharge         Objective   /70   Resp 14   Ht 165.1 cm (65\")   Wt 74.7 kg (164 lb 9.6 oz)   LMP  (LMP Unknown) Comment: Ban IUD  BMI 27.39 kg/m²     General:  well developed; well nourished  no acute distress   Skin:  No suspicious lesions seen   Thyroid: normal to inspection and palpation   Lungs:  breathing is unlabored   Heart:  regular rate and rhythm, S1, S2 normal, no murmur, click, rub or gallop   Breasts:  Not performed.   Abdomen: soft, non-tender; no masses  no umbilical or inginual hernias are present  no hepato-splenomegaly   Pelvis: Clinical staff was present for exam  External genitalia:  normal appearance of the external genitalia including Bartholin's and [place]'s glands.  :  urethral meatus " normal;  Vaginal:  normal pink mucosa without prolapse or lesions.  Cervix:  cervical motion tenderness is absent;  Uterus:  normal size, shape and consistency. tenderness to palpation is present;  Adnexa:  tenderness of the bilateral adnexa to  superficial and deep palpation;     Lab Review   No data reviewed    Imaging   No data reviewed        Assessment   1. Chronic pelvic pain     Plan   1. Discussed likelihood of persisting and likely worsening endometriosis.  Discussed options of medical versus surgical management.  Patient will call with decision on further treatment of her pain.  She will otherwise return to clinic in 4-6 weeks for her repeat Pap smear      No orders of the defined types were placed in this encounter.         This note was electronically signed.    Tha Matt M.D. [place]

## 2018-11-06 ENCOUNTER — OFFICE VISIT (OUTPATIENT)
Dept: OBSTETRICS AND GYNECOLOGY | Facility: CLINIC | Age: 23
End: 2018-11-06

## 2018-11-06 VITALS
HEIGHT: 65 IN | RESPIRATION RATE: 14 BRPM | BODY MASS INDEX: 27.46 KG/M2 | SYSTOLIC BLOOD PRESSURE: 108 MMHG | WEIGHT: 164.8 LBS | DIASTOLIC BLOOD PRESSURE: 66 MMHG

## 2018-11-06 DIAGNOSIS — Z01.419 WELL WOMAN EXAM WITH ROUTINE GYNECOLOGICAL EXAM: Primary | ICD-10-CM

## 2018-11-06 DIAGNOSIS — Z01.419 WELL WOMAN EXAM WITH ROUTINE GYNECOLOGICAL EXAM: ICD-10-CM

## 2018-11-06 PROCEDURE — 99395 PREV VISIT EST AGE 18-39: CPT | Performed by: OBSTETRICS & GYNECOLOGY

## 2018-11-06 NOTE — PROGRESS NOTES
Subjective   Chief Complaint   Patient presents with   • Gynecologic Exam     No complaints     Wanda Hunt is a 23 y.o. year old  presenting to be seen for her annual exam.     SEXUAL Hx:  She is currently sexually active.  In the past year there has not been new sexual partners.    Condoms are not typically used.  She would not like to be screened for STD's at today's exam.  Current birth control method: IUD - Ban.  She is happy with her current method of contraception and does not want to discuss alternative methods of contraception.  MENSTRUAL Hx:  Patient's last menstrual period was 2018 (exact date).  In the past 6 months her cycles have been regular, predictable and occur monthly.  Her menstrual flow is normal.   Each month on average there are roughly 0 day(s) of very heavy flow.    Intermenstrual bleeding is absent.    Post-coital bleeding is absent.  Dysmenorrhea: is not affecting her activities of daily living  PMS: is not affecting her activities of daily living  Her cycles are not a source of concern for her that she wishes to discuss today.  HEALTH Hx:  She exercises regularly: no (and has no plans to become more active).  She wears her seat belt: yes.  She has concerns about domestic violence: no.  OTHER COMPLAINTS:  Nothing else    The following portions of the patient's history were reviewed and updated as appropriate:problem list, current medications, allergies, past family history, past medical history, past social history and past surgical history.    Smoking status: Never Smoker                                                              Smokeless tobacco: Never Used                        Review of Systems  Constitutional POS: nothing reported    NEG: anorexia or night sweats   Gastointestinal POS: nothing reported    NEG: bloating, change in bowel habits, melena or reflux symptoms   Genitourinary POS: nothing reported    NEG: dysuria or hematuria   Integument POS: nothing  "reported    NEG: moles that are changing in size, shape, color or rashes   Breast POS: nothing reported    NEG: persistent breast lump, skin dimpling or nipple discharge        Objective   /66   Resp 14   Ht 165.1 cm (65\")   Wt 74.8 kg (164 lb 12.8 oz)   LMP 11/06/2018 (Exact Date) Comment: Ban IUD  Breastfeeding? No   BMI 27.42 kg/m²     General:  well developed; well nourished  no acute distress   Skin:  No suspicious lesions seen   Thyroid: normal to inspection and palpation   Breasts:  Examined in supine position  Symmetric without masses or skin dimpling  Nipples normal without inversion, lesions or discharge  There are no palpable axillary nodes   Abdomen: soft, non-tender; no masses  no umbilical or inginual hernias are present  no hepato-splenomegaly   Pelvis: Clinical staff was present for exam  External genitalia:  normal appearance of the external genitalia including Bartholin's and [place]'s glands.  :  urethral meatus normal;  Vaginal:  normal pink mucosa without prolapse or lesions.  Cervix:   normal appearance.  IUD strings visualized.  Pap smear collected  Uterus:  normal size, shape and consistency. tenderness to palpation is present;  Adnexa:  tenderness of the bilateral adnexa to  superficial and deep palpation;        Assessment   1. Well woman exam     Plan   1. Await Pap smear results for plan of care.  Patient will return to clinic in 1 year or on an as-needed basis.      No orders of the defined types were placed in this encounter.         This note was electronically signed.    Tha Matt MD MBA  November 6, 2018  "

## 2018-11-30 ENCOUNTER — OFFICE VISIT (OUTPATIENT)
Dept: OBSTETRICS AND GYNECOLOGY | Facility: CLINIC | Age: 23
End: 2018-11-30

## 2018-11-30 VITALS
BODY MASS INDEX: 27.26 KG/M2 | SYSTOLIC BLOOD PRESSURE: 104 MMHG | WEIGHT: 163.6 LBS | DIASTOLIC BLOOD PRESSURE: 70 MMHG | RESPIRATION RATE: 14 BRPM | HEIGHT: 65 IN

## 2018-11-30 DIAGNOSIS — R87.810 ASCUS WITH POSITIVE HIGH RISK HPV CERVICAL: Primary | ICD-10-CM

## 2018-11-30 DIAGNOSIS — R87.610 ASCUS WITH POSITIVE HIGH RISK HPV CERVICAL: ICD-10-CM

## 2018-11-30 DIAGNOSIS — R87.610 ASCUS WITH POSITIVE HIGH RISK HPV CERVICAL: Primary | ICD-10-CM

## 2018-11-30 DIAGNOSIS — R87.810 ASCUS WITH POSITIVE HIGH RISK HPV CERVICAL: ICD-10-CM

## 2018-11-30 PROCEDURE — 57455 BIOPSY OF CERVIX W/SCOPE: CPT | Performed by: OBSTETRICS & GYNECOLOGY

## 2018-11-30 NOTE — PROGRESS NOTES
Colposcopy    Date of procedure:  11/30/2018    Risks and benefits discussed? yes  All questions answered? yes  Consents given by the patient  Written consent obtained? yes    Pre-op indication: ASCUS with POSITIVE high risk HPV  Procedure documentation:    The cervix was initially viewed colposcopically through a green filter.  The cervix was next bathed in acetic acid.   The findings were as follows:      The transformation zone was able to be seen adequately.    Findings: Acetowhite noted at 6 o'clock and 12 o'clock  Punctation noted at 6 o'clock  Mosaicism noted at 6 o'clock and 12 o'clock     Ectocervical biopsies taken from 6 o'clock.  Monsels solution was applied to the biopsy sites..    An ECC was not performed.      Colposcopic Impression: 1. Adequate colposcopy  2. Colposcopic findings are consistent with PAP       Plan: Will base further treatment on pathology results   Post biopsy instructions given to patient.   Specimens labelled and sent to pathology.   follow-up in 6 month(s) for repeat Pap      This note was electronically signed.    Tha Matt M.D. [place].  November 30, 2018

## 2018-12-03 ENCOUNTER — TELEPHONE (OUTPATIENT)
Dept: OBSTETRICS AND GYNECOLOGY | Facility: CLINIC | Age: 23
End: 2018-12-03

## 2018-12-03 NOTE — TELEPHONE ENCOUNTER
Dr. Matt Pt    Pt had a colpo on Friday.  She has been very nauseous since Sat and has had quite a bit of tissue discharge - gray to white in color, no bleeding, no fever.    Callback [phone]    Cami Smalls

## 2018-12-04 NOTE — TELEPHONE ENCOUNTER
Spoke w/pt about d/c, advised colored discharge could be from monsel's used to stop bleeding after Colposcopy. Pt stated she is still cramping and nauseous. Advised pt I would send a message to Dr. Matt and once I have a reply I'd give her a call back.

## 2018-12-05 NOTE — TELEPHONE ENCOUNTER
Was a straight forward colpo. Not sure why she's having those symptoms. If they persist through the weekend, have her come in on monday

## 2018-12-06 NOTE — TELEPHONE ENCOUNTER
Pt returned call, advised her of Dr Matt' recommendation to be seen Monday if c/o's persist through the weekend, Pt report all s/s have resolved as of last night.

## 2018-12-10 ENCOUNTER — TELEPHONE (OUTPATIENT)
Dept: OBSTETRICS AND GYNECOLOGY | Facility: CLINIC | Age: 23
End: 2018-12-10

## 2018-12-11 ENCOUNTER — TELEPHONE (OUTPATIENT)
Dept: OBSTETRICS AND GYNECOLOGY | Facility: CLINIC | Age: 23
End: 2018-12-11

## 2018-12-14 ENCOUNTER — OFFICE VISIT (OUTPATIENT)
Dept: INTERNAL MEDICINE | Facility: CLINIC | Age: 23
End: 2018-12-14

## 2018-12-14 VITALS
SYSTOLIC BLOOD PRESSURE: 106 MMHG | DIASTOLIC BLOOD PRESSURE: 64 MMHG | WEIGHT: 162 LBS | OXYGEN SATURATION: 98 % | BODY MASS INDEX: 26.99 KG/M2 | HEIGHT: 65 IN | HEART RATE: 81 BPM | TEMPERATURE: 97.7 F

## 2018-12-14 DIAGNOSIS — J06.9 ACUTE URI: Primary | ICD-10-CM

## 2018-12-14 DIAGNOSIS — R11.0 NAUSEA: ICD-10-CM

## 2018-12-14 PROCEDURE — 87804 INFLUENZA ASSAY W/OPTIC: CPT | Performed by: PHYSICIAN ASSISTANT

## 2018-12-14 PROCEDURE — 87880 STREP A ASSAY W/OPTIC: CPT | Performed by: PHYSICIAN ASSISTANT

## 2018-12-14 PROCEDURE — 99213 OFFICE O/P EST LOW 20 MIN: CPT | Performed by: PHYSICIAN ASSISTANT

## 2018-12-14 RX ORDER — AZITHROMYCIN 250 MG/1
TABLET, FILM COATED ORAL
Qty: 6 TABLET | Refills: 0 | Status: SHIPPED | OUTPATIENT
Start: 2018-12-14 | End: 2019-02-07

## 2018-12-14 RX ORDER — BROMPHENIRAMINE MALEATE, PSEUDOEPHEDRINE HYDROCHLORIDE, AND DEXTROMETHORPHAN HYDROBROMIDE 2; 30; 10 MG/5ML; MG/5ML; MG/5ML
10 SYRUP ORAL 4 TIMES DAILY PRN
Qty: 200 ML | Refills: 0 | Status: SHIPPED | OUTPATIENT
Start: 2018-12-14 | End: 2019-02-07

## 2018-12-14 RX ORDER — ONDANSETRON HYDROCHLORIDE 8 MG/1
8 TABLET, FILM COATED ORAL EVERY 8 HOURS PRN
Qty: 12 TABLET | Refills: 0 | Status: SHIPPED | OUTPATIENT
Start: 2018-12-14 | End: 2019-07-30

## 2018-12-14 NOTE — PROGRESS NOTES
Subjective     Chief Complaint: sore throat    History of Present Illness     Wanda Hunt is a 23 y.o. female presenting with complaint of fatigue, body aches, sore throat, sinus pressure, nausea, diarrhea, occasional cough for 1 week.  Has tried using Flonase, Zyrtec, OTC cough and cold medicine without much relief. Denies any fevers, SOA, chest pain, abdominal pain, dysuria. Recently around sick children.    The following portions of the patient's history were reviewed and updated as appropriate: current medications, allergies, PMH.    Review of Systems   Constitutional: Positive for fatigue. Negative for appetite change, chills, fever and unexpected weight change.   HENT: Positive for sinus pressure and sore throat. Negative for congestion, ear pain, hearing loss, nosebleeds, tinnitus and trouble swallowing.    Eyes: Negative for pain, discharge, redness, itching and visual disturbance.   Respiratory: Negative for cough, chest tightness, shortness of breath and wheezing.    Cardiovascular: Negative for chest pain, palpitations and leg swelling.   Gastrointestinal: Positive for diarrhea and nausea. Negative for abdominal pain, blood in stool, constipation and vomiting.   Endocrine: Negative for cold intolerance, heat intolerance, polydipsia, polyphagia and polyuria.   Genitourinary: Negative for decreased urine volume, dysuria, flank pain, frequency and hematuria.   Musculoskeletal: Negative for arthralgias, back pain, gait problem, joint swelling, myalgias, neck pain and neck stiffness.   Skin: Negative for color change and rash.   Allergic/Immunologic: Negative for environmental allergies, food allergies and immunocompromised state.   Neurological: Positive for headaches. Negative for dizziness, syncope, weakness and light-headedness.   Hematological: Negative for adenopathy. Does not bruise/bleed easily.   Psychiatric/Behavioral: Negative for dysphoric mood, sleep disturbance and suicidal ideas. The patient  "is not nervous/anxious.        Objective     Vitals:    12/14/18 0940   BP: 106/64   Pulse: 81   Temp: 97.7 °F (36.5 °C)   SpO2: 98%   Weight: 73.5 kg (162 lb)   Height: 165.1 cm (65\")       Physical Exam   Constitutional: Appears well-developed and well-nourished.   HENT:   Right Ear: Tympanic membrane and external ear normal.   Left Ear: TM effusion.  Nose: Nose normal.   Mouth/Throat: OP erythema, no edema or exudates  Eyes: EOM are normal. Pupils are equal, round, and reactive to light.   Neck: Normal range of motion. Neck supple.   Cardiovascular: Normal rate, regular rhythm.  Pulmonary/Chest: Effort normal and breath sounds normal.   Abdominal: Soft. Bowel sounds are normal. There is no tenderness.   Musculoskeletal: Normal range of motion.   Lymphadenopathy: No cervical adenopathy noted.   Neurological: Alert and oriented to person, place, and time.   Skin: Skin is warm and dry.   Psychiatric: Exhibits a normal mood and affect.     Assessment/Plan     Diagnoses and all orders for this visit:    Acute URI  -     POCT rapid strep A  -     brompheniramine-pseudoephedrine-DM 30-2-10 MG/5ML syrup; Take 10 mL by mouth 4 (Four) Times a Day As Needed for Congestion or Cough.    RSS negative.  Increase water intake, tylenol prn.    Nausea  May use zofran prn.    Leila Ocasio PA-C  12/14/2018         Please note that portions of this note were completed with a voice recognition program. Efforts were made to edit dictation, but occasionally words are mistranscribed.  "

## 2019-02-07 ENCOUNTER — OFFICE VISIT (OUTPATIENT)
Dept: RETAIL CLINIC | Facility: CLINIC | Age: 24
End: 2019-02-07

## 2019-02-07 VITALS
DIASTOLIC BLOOD PRESSURE: 60 MMHG | HEIGHT: 65 IN | RESPIRATION RATE: 20 BRPM | SYSTOLIC BLOOD PRESSURE: 94 MMHG | WEIGHT: 160 LBS | HEART RATE: 101 BPM | OXYGEN SATURATION: 97 % | BODY MASS INDEX: 26.66 KG/M2 | TEMPERATURE: 98.3 F

## 2019-02-07 DIAGNOSIS — J02.9 PHARYNGITIS, UNSPECIFIED ETIOLOGY: Primary | ICD-10-CM

## 2019-02-07 LAB
EXPIRATION DATE: NORMAL
INTERNAL CONTROL: NORMAL
Lab: NORMAL
S PYO AG THROAT QL: NEGATIVE

## 2019-02-07 PROCEDURE — 99213 OFFICE O/P EST LOW 20 MIN: CPT | Performed by: NURSE PRACTITIONER

## 2019-02-07 PROCEDURE — 87880 STREP A ASSAY W/OPTIC: CPT | Performed by: NURSE PRACTITIONER

## 2019-02-07 RX ORDER — CEPHALEXIN 500 MG/1
500 CAPSULE ORAL 3 TIMES DAILY
Qty: 30 CAPSULE | Refills: 0 | Status: SHIPPED | OUTPATIENT
Start: 2019-02-07 | End: 2019-02-17

## 2019-02-07 NOTE — PROGRESS NOTES
Subjective   Flu Symptoms    Wanda Hunt is a 23 y.o. female who presents with suspected flu symptoms. Patient with history of strep throat, most recently diagnosed on 12/1/18, treated with azithromycin.    Flu Symptoms   This is a new problem. Episode onset: 2/2/2019. The problem occurs constantly. The problem has been waxing and waning. Associated symptoms include coughing, fatigue, a fever (now resolved), headaches, myalgias, nausea and a sore throat. Pertinent negatives include no abdominal pain, chills, congestion, diaphoresis, neck pain, rash, vomiting or weakness. The symptoms are aggravated by swallowing. Treatments tried: Sudafed, Apple Cider Vinegar. The treatment provided no relief.        History Obtained from: Patient    Past Medical History:   Diagnosis Date   • Endometriosis    • Fainting    • History of abnormal cervical Pap smear 05/2018 11/6/2018   • Infectious mononucleosis    • IUD (intrauterine device) in place     Ban IUD   • Migraine      Past Surgical History:   Procedure Laterality Date   • APPENDECTOMY      Onset Date: Dec. 2013   • CHOLECYSTECTOMY     • COLONOSCOPY W/ BIOPSIES  2018   • DIAGNOSTIC LAPAROSCOPY  2015    Endometrosis   • DIAGNOSTIC LAPAROSCOPY  06/27/2018   • MANDIBLE FRACTURE SURGERY     • TONSILLECTOMY      Onset Date: March 2015     Social History     Tobacco Use   • Smoking status: Never Smoker   • Smokeless tobacco: Never Used   Substance Use Topics   • Alcohol use: Yes   • Drug use: No     Family History   Problem Relation Age of Onset   • Migraines Mother    • Hypertension Father    • Colon polyps Father         precancerous   • Arthritis Other    • Alcohol abuse Other    • Heart attack Other    • Obesity Other    • Hypertension Other    • Cancer Paternal Grandmother    • Diabetes Paternal Grandmother    • Stroke Paternal Grandmother      Allergies   Allergen Reactions   • Flagyl [Metronidazole] Anaphylaxis   • Amoxicillin Hives   • Penicillins Hives  "    Current Outpatient Medications   Medication Sig Dispense Refill   • Levonorgestrel (LUIZ) 13.5 MG intrauterine device IUD 1 each by Intrauterine route 1 (one) time.     • cephalexin (KEFLEX) 500 MG capsule Take 1 capsule by mouth 3 (Three) Times a Day for 10 days. 30 capsule 0   • ondansetron (ZOFRAN) 8 MG tablet Take 1 tablet by mouth Every 8 (Eight) Hours As Needed for Nausea or Vomiting. 12 tablet 0     No current facility-administered medications for this visit.         The following portions of the patient's history were reviewed and updated as appropriate: allergies, current medications, past family history, past medical history, past social history and past surgical history.    Review of Systems   Constitutional: Positive for fatigue and fever (now resolved). Negative for chills and diaphoresis.   HENT: Positive for ear pain, rhinorrhea and sore throat. Negative for congestion, ear discharge, postnasal drip, trouble swallowing and voice change.    Eyes: Negative.    Respiratory: Positive for cough. Negative for shortness of breath and wheezing.    Cardiovascular: Negative.    Gastrointestinal: Positive for nausea. Negative for abdominal pain, diarrhea and vomiting.   Musculoskeletal: Positive for myalgias. Negative for neck pain and neck stiffness.   Skin: Negative for rash and wound.   Allergic/Immunologic: Negative for immunocompromised state.   Neurological: Positive for headaches. Negative for dizziness, weakness and light-headedness.   Hematological: Negative.    Psychiatric/Behavioral: Negative.        Objective     VITAL SIGNS:   Vitals:    02/07/19 0822   BP: 94/60   Pulse: 101   Resp: 20   Temp: 98.3 °F (36.8 °C)   SpO2: 97%   Weight: 72.6 kg (160 lb)   Height: 165.1 cm (65\")   PainSc:   6   PainLoc: Throat   Body mass index is 26.63 kg/m².    Physical Exam   Constitutional: She is cooperative.  Non-toxic appearance. Ill appearance: fatigued appearing. No distress.   HENT:   Head: Atraumatic. "   Right Ear: External ear and ear canal normal. Tympanic membrane is erythematous. Tympanic membrane is not perforated and not bulging.   Left Ear: External ear and ear canal normal. Tympanic membrane is erythematous. Tympanic membrane is not perforated and not bulging.   Nose: Mucosal edema (brisk erythema of nasal mucosa) present. No rhinorrhea. Right sinus exhibits no maxillary sinus tenderness and no frontal sinus tenderness. Left sinus exhibits no maxillary sinus tenderness and no frontal sinus tenderness.   Mouth/Throat: Uvula is midline and mucous membranes are normal. No uvula swelling. Posterior oropharyngeal erythema present. No oropharyngeal exudate or posterior oropharyngeal edema. Tonsils are 0 on the right. Tonsils are 1+ on the left.   Eyes: EOM and lids are normal. Pupils are equal, round, and reactive to light. Right conjunctiva is not injected. Left conjunctiva is not injected.   Neck: Normal range of motion and phonation normal. Neck supple.   Cardiovascular: Regular rhythm. Tachycardia present.   Pulmonary/Chest: Effort normal and breath sounds normal.   Lymphadenopathy:     Cervical adenopathy: cervical tenderness, no palpable nodes.        Right: No supraclavicular adenopathy present.        Left: No supraclavicular adenopathy present.   Neurological: She is alert. She is not disoriented. Coordination and gait normal.   Skin: Skin is warm and dry. Capillary refill takes less than 2 seconds. No cyanosis. No pallor.   Psychiatric: Her behavior is normal. She is attentive.       LABS:   Results for orders placed or performed in visit on 02/07/19   POCT rapid strep A   Result Value Ref Range    Rapid Strep A Screen Negative Negative, VALID, INVALID, Not Performed    Internal Control Passed Passed    Lot Number [id number]     Expiration Date 07/31/20          Assessment/Plan     Wanda was seen today for flu symptoms.    Diagnoses and all orders for this visit:    Pharyngitis, unspecified  etiology  -     POCT rapid strep A    Other orders  -     cephalexin (KEFLEX) 500 MG capsule; Take 1 capsule by mouth 3 (Three) Times a Day for 10 days.      PLAN:  Watch and wait approach advised. Treat symptoms with OTC medications at this time. Patient should follow up with primary care provider if symptoms fail to improve, worsen or for the development of new symptoms that need attention.    The patient voiced understanding and agreement to the patient treatment plan and instructions       TANK Herrera

## 2019-02-07 NOTE — PATIENT INSTRUCTIONS
Pharyngitis  Pharyngitis is a sore throat (pharynx). There is redness, pain, and swelling of your throat.  Follow these instructions at home:  · Drink enough fluids to keep your pee (urine) clear or pale yellow.  · Only take medicine as told by your doctor.  ? You may get sick again if you do not take medicine as told. Finish your medicines, even if you start to feel better.  ? Do not take aspirin.  · Rest.  · Rinse your mouth (gargle) with salt water (½ tsp of salt per 1 qt of water) every 1-2 hours. This will help the pain.  · If you are not at risk for choking, you can suck on hard candy or sore throat lozenges.  Contact a doctor if:  · You have large, tender lumps on your neck.  · You have a rash.  · You cough up green, yellow-brown, or bloody spit.  Get help right away if:  · You have a stiff neck.  · You drool or cannot swallow liquids.  · You throw up (vomit) or are not able to keep medicine or liquids down.  · You have very bad pain that does not go away with medicine.  · You have problems breathing (not from a stuffy nose).  This information is not intended to replace advice given to you by your health care provider. Make sure you discuss any questions you have with your health care provider.  Document Released: 06/05/2009 Document Revised: 05/25/2017 Document Reviewed: 08/25/2014  [employer] Patient Education © 2017 [employer]

## 2019-07-30 ENCOUNTER — OFFICE VISIT (OUTPATIENT)
Dept: INTERNAL MEDICINE | Facility: CLINIC | Age: 24
End: 2019-07-30

## 2019-07-30 VITALS
HEART RATE: 68 BPM | SYSTOLIC BLOOD PRESSURE: 114 MMHG | WEIGHT: 163 LBS | TEMPERATURE: 97.8 F | HEIGHT: 65 IN | DIASTOLIC BLOOD PRESSURE: 74 MMHG | BODY MASS INDEX: 27.16 KG/M2 | OXYGEN SATURATION: 100 %

## 2019-07-30 DIAGNOSIS — H69.82 DYSFUNCTION OF LEFT EUSTACHIAN TUBE: ICD-10-CM

## 2019-07-30 DIAGNOSIS — R19.7 DIARRHEA, UNSPECIFIED TYPE: Primary | ICD-10-CM

## 2019-07-30 PROCEDURE — 99213 OFFICE O/P EST LOW 20 MIN: CPT | Performed by: NURSE PRACTITIONER

## 2019-07-30 NOTE — PROGRESS NOTES
"Date: 2019    Name: Wanda Hunt  : 1995    Chief Complaint:   Chief Complaint   Patient presents with   • Dizziness     headache, diarrhea x 1 week       HPI:  Dizziness, headache, diarrhea x 1 week.  Believes she may be dehydrated, has been drinking more water and 1 gatorade today.  Episodes of diarrhea have decreased today.  No sick contacts. Doesn't recall eating anything out of the ordinary.  Symptoms not associated with eating, standing up, other activities.  Earache 1-2 weeks ago, ears continue to feel full.  She used nasal spray, helped with ear fullness. Denies fever, chills, sore throat, rash, congestion, cough, SOA, wheezing, palpitations, increase in nausea, vomiting.       History:  The following portions of the patient's history were reviewed and updated as appropriate: allergies, current medications, past medical history, family history, surgical history, social history and problem list.     ROS:  Review of Systems   Constitutional: Negative for fatigue.   HENT: Positive for postnasal drip and sneezing (occasional). Negative for congestion, ear discharge, hearing loss, sinus pressure, sore throat and tinnitus.    Eyes: Negative for redness and itching.   Cardiovascular: Negative for chest pain and leg swelling.   Gastrointestinal: Negative for abdominal pain and constipation.   Genitourinary: Negative for frequency and urgency.   Musculoskeletal: Negative for myalgias.   Skin: Negative for rash.       VS:  Vitals:    19 1550   BP: 114/74   Pulse: 68   Temp: 97.8 °F (36.6 °C)   TempSrc: Temporal   SpO2: 100%   Weight: 73.9 kg (163 lb)   Height: 165.1 cm (65\")     Body mass index is 27.12 kg/m².    PE:  Physical Exam   Constitutional: She is oriented to person, place, and time. She appears well-developed and well-nourished.   HENT:   Head: Normocephalic.   Right Ear: Tympanic membrane, external ear and ear canal normal.   Left Ear: External ear and ear canal normal. Tympanic " membrane is retracted.   Nose: Mucosal edema present. No sinus tenderness.   Mouth/Throat: Uvula is midline and mucous membranes are normal. Posterior oropharyngeal erythema present.   Eyes: Conjunctivae are normal.   Cardiovascular: Normal rate, regular rhythm, normal heart sounds and intact distal pulses.   Pulmonary/Chest: Effort normal and breath sounds normal.   Abdominal: Soft. Bowel sounds are increased. There is no tenderness. There is no CVA tenderness, no tenderness at McBurney's point and negative Chanel's sign.   Lymphadenopathy:     She has no cervical adenopathy.   Neurological: She is alert and oriented to person, place, and time.   Skin: Skin is warm and dry. Capillary refill takes less than 2 seconds. Turgor is normal.     Assessment/Plan:  Wanda was seen today for dizziness.    Diagnoses and all orders for this visit:    Diarrhea, unspecified type         - [place] diet, no greasy or spicy foods; advance as tolerated when diarrhea improved         - Drink plenty of clear, decaffeinated fluids, as tolerated. May have 1-2 small gatorades or similar sports drinks daily, until symptoms improve.         - Good hand hygiene practices    Dysfunction of left eustachian tube          - Continue using nasal spray, if effective, prn ear fullness           Return if symptoms worsen or fail to improve.